# Patient Record
Sex: FEMALE | Race: WHITE | Employment: STUDENT | URBAN - METROPOLITAN AREA
[De-identification: names, ages, dates, MRNs, and addresses within clinical notes are randomized per-mention and may not be internally consistent; named-entity substitution may affect disease eponyms.]

---
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
## 2018-02-12 DIAGNOSIS — E03.9 ACQUIRED HYPOTHYROIDISM: ICD-10-CM

## 2018-02-12 RX ORDER — LEVOTHYROXINE SODIUM 200 UG/1
TABLET ORAL
Qty: 90 TAB | Refills: 0 | OUTPATIENT
Start: 2018-02-12

## 2018-02-12 RX ORDER — LEVOTHYROXINE SODIUM 50 UG/1
TABLET ORAL
Qty: 90 TAB | Refills: 0 | Status: SHIPPED | OUTPATIENT
Start: 2018-02-12 | End: 2019-03-18 | Stop reason: SDUPTHER

## 2018-02-12 RX ORDER — LEVOTHYROXINE SODIUM 50 UG/1
TABLET ORAL
Qty: 90 TAB | Refills: 0 | OUTPATIENT
Start: 2018-02-12

## 2018-02-12 RX ORDER — LEVOTHYROXINE SODIUM 200 UG/1
TABLET ORAL
Qty: 90 TAB | Refills: 0 | Status: SHIPPED | OUTPATIENT
Start: 2018-02-12 | End: 2019-01-31 | Stop reason: SDUPTHER

## 2018-02-24 LAB
25(OH)D3+25(OH)D2 SERPL-MCNC: NORMAL NG/ML
CHOLEST SERPL-MCNC: NORMAL MG/DL
EST. AVERAGE GLUCOSE BLD GHB EST-MCNC: 80 MG/DL
HBA1C MFR BLD: 4.4 % (ref 4.8–5.6)
HDLC SERPL-MCNC: NORMAL MG/DL
INTERPRETATION, 910389: NORMAL
PDF IMAGE, 910387: NORMAL
T4 FREE SERPL-MCNC: NORMAL NG/DL
TRIGL SERPL-MCNC: NORMAL MG/DL (ref ?–150)
TSH SERPL DL<=0.005 MIU/L-ACNC: NORMAL UIU/ML

## 2018-02-27 DIAGNOSIS — E03.9 HYPOTHYROIDISM, UNSPECIFIED TYPE: Primary | ICD-10-CM

## 2018-02-27 DIAGNOSIS — E55.9 VITAMIN D DEFICIENCY: ICD-10-CM

## 2018-02-27 DIAGNOSIS — E78.5 HYPERLIPIDEMIA, UNSPECIFIED HYPERLIPIDEMIA TYPE: ICD-10-CM

## 2018-02-27 NOTE — PROGRESS NOTES
Please notify patient regarding their test results:    Unfortunately, due to unclear reasons, lab was unable to process prior lab orders. Patient to return to clinic again to repeat fasting lab work.

## 2018-03-08 NOTE — PROGRESS NOTES
Incoming call from patient's mother on phi form. Patient's  verified. Informed mother labs were not drawn for unknown reason. She states she received a bill for th A1C. Advised that was the only lab that was collected. Mother states she cannot afford these labs (vitamin d, lipid panel, tsh) as she pays out of pocket for this. Advised it is up to the patient whether or not she wants to get these labs done we can only recommend them. Most importantly tsh needs to be drawn for med management. Mother understands.  Advised to come by whenever for labs

## 2018-03-12 ENCOUNTER — OFFICE VISIT (OUTPATIENT)
Dept: FAMILY MEDICINE CLINIC | Age: 18
End: 2018-03-12

## 2018-03-12 VITALS
HEART RATE: 86 BPM | SYSTOLIC BLOOD PRESSURE: 94 MMHG | RESPIRATION RATE: 18 BRPM | TEMPERATURE: 98.1 F | WEIGHT: 293 LBS | BODY MASS INDEX: 44.41 KG/M2 | DIASTOLIC BLOOD PRESSURE: 64 MMHG | HEIGHT: 68 IN | OXYGEN SATURATION: 96 %

## 2018-03-12 DIAGNOSIS — R07.81 RIB PAIN ON RIGHT SIDE: Primary | ICD-10-CM

## 2018-03-12 DIAGNOSIS — E03.9 ACQUIRED HYPOTHYROIDISM: ICD-10-CM

## 2018-03-12 DIAGNOSIS — R09.81 SINUS CONGESTION: ICD-10-CM

## 2018-03-12 NOTE — PATIENT INSTRUCTIONS
Strain or Sprain: Care Instructions  Your Care Instructions    A strain happens when you overstretch, or pull, a muscle. A sprain occurs when you stretch or tear a ligament, the tough tissue that connects one bone to another. These problems can happen when you exercise or lift something or when you are in an accident. Rest and other home care can help strains and sprains heal.  The doctor has checked you carefully, but problems can develop later. If you notice any problems or new symptoms,  get medical treatment right away. Follow-up care is a key part of your treatment and safety. Be sure to make and go to all appointments, and call your doctor if you are having problems. It's also a good idea to know your test results and keep a list of the medicines you take. How can you care for yourself at home? · If your doctor gave you a sling, splint, brace, or immobilizer, use it exactly as directed. · Rest the strained or sprained area, and follow your doctor's advice about when you can be active again. · Put ice or a cold pack on the sore area for 10 to 20 minutes at a time to stop swelling. Try this every 1 to 2 hours for 3 days (when you are awake) or until the swelling goes down. Put a thin cloth between the ice pack and your skin. Keep your splint or brace dry. · Prop up a sore arm or leg on a pillow when you ice it or anytime you sit or lie down. Try to keep it higher than the level of your heart. This will help reduce swelling. · Take pain medicines exactly as directed. ¨ If the doctor gave you a prescription medicine for pain, take it as prescribed. ¨ If you are not taking a prescription pain medicine, ask your doctor if you can take an over-the-counter medicine. · Do exercises as directed by your doctor or physical therapist.  · Return to your usual level of activity slowly. · Do not do anything that makes the pain worse. When should you call for help?   Call your doctor now or seek immediate medical care if:  ? · You have severe or increasing pain. ? · You have tingling, weakness, or numbness in the area. ? · The area turns cold or changes color. ? · Your cast or splint feels too tight. ? · You have symptoms of a blood clot, such as:  ¨ Pain in your calf, back of the knee, thigh, or groin. ¨ Redness and swelling in your leg or groin. ? · You cannot move the strained part of your body. ? Watch closely for changes in your health, and be sure to contact your doctor if:  ? · You do not get better as expected. Where can you learn more? Go to http://angelito-brandon.info/. Enter B563 in the search box to learn more about \"Strain or Sprain: Care Instructions. \"  Current as of: March 21, 2017  Content Version: 11.4  © 7867-3171 [employer]. Care instructions adapted under license by [employer] (which disclaims liability or warranty for this information). If you have questions about a medical condition or this instruction, always ask your healthcare professional. [address] any warranty or liability for your use of this information.

## 2018-03-12 NOTE — LETTER
3/21/2018 12:07 PM 
 
Ms. Radha Dhillon [address]  [facility] [address] Dear Radha Dhillon: 
 
Please find your most recent results below. Resulted Orders TSH AND FREE T4 Result Value Ref Range TSH 0.327 (L) 0.450 - 4.500 uIU/mL T4, Free 2.36 (H) 0.93 - 1.60 ng/dL Narrative Performed at:  [address]  [id number] : Tali Quintanilla MD, Phone:  [phone] RECOMMENDATIONS: 
 
TSH shows that she is on too high of dose (likely due to the fact you are more compliant with taking levothyroxine which is good). Would reduce levothyroxine from 250 mcg daily to 225 mcg daily (can just take 1/2 tab of 50 mcg tab + one 200 mg tab daily = 225 mcg). Will recheck TSH at upcoming appt in April. Please call me if you have any questions: [phone] Sincerely, Amy Stovall MD

## 2018-03-12 NOTE — PROGRESS NOTES
Chief Complaint   Patient presents with    Rib Pain     right side for four to five days - does not recall injuring self    Cold Symptoms     ? sinus infections -for four days      1. Have you been to the ER, urgent care clinic since your last visit? Hospitalized since your last visit? No    2. Have you seen or consulted any other health care providers outside of the [facility] since your last visit? Include any pap smears or colon screening.  No

## 2018-03-12 NOTE — LETTER
NOTIFICATION RETURN TO WORK / SCHOOL 
 
3/12/2018 2:59 PM 
 
Ms. Leola Vang [address]  [facility] [address] To Whom It May Concern: 
 
Leola Vang is currently under the care of RONDA Boone. She was seen 3/12/2018 for an appointment. She will return to work/school on: 3/13/2018 If there are questions or concerns please have the patient contact our office. Sincerely, Dago Salazar MD

## 2018-03-12 NOTE — MR AVS SNAPSHOT
[address] [facility] 
 
 
 [address] [place] [address] 
[phone] Patient: Neli Estrada MRN: [id number] Ondina Hart Visit Information Date & Time Provider Department Dept. Phone Encounter #  
 3/12/2018  2:15 PM Esthela Wren MD [address] [place] [address] [phone] [id number] Follow-up Instructions Return if symptoms worsen or fail to improve. Your Appointments 4/20/2018  1:45 PM  
ROUTINE CARE with Esthela Wren MD  
[facility]) Appt Note: 6 month follow up visit [address] [address]  
[phone]  
  
   
 [address] [address] Upcoming Health Maintenance Date Due DTaP/Tdap/Td series (7 - Td) 6/18/2026 Allergies as of 3/12/2018  Review Complete On: 3/12/2018 By: Grisel Johnson LPN Severity Noted Reaction Type Reactions Hazelnut High 08/09/2016    Anaphylaxis Adhesive Tape-silicones  54/54/8511    Itching Paper tape [place]  05/10/2016    Swelling Grass Pollen  05/10/2016    Unknown (comments) + testing Current Immunizations  Reviewed on 12/26/2017 Name Date DTaP 12/16/2002, 8/10/1999, 8/8/1999, 1998, 1998 HPV 1/12/2009, 9/18/2008, 6/27/2008 Hep A Vaccine 9/18/2008, 12/16/2002 Hep B Vaccine 5/12/1999, 1998, 1998 Hib 8/10/1999, 1998, 1998 Influenza Vaccine (Quad) PF 10/20/2017, 10/11/2016 MMR 12/16/2002, 8/10/1999 Meningococcal (MCV4O) Vaccine 8/9/2016 Meningococcal ACWY Vaccine 5/6/2010 Meningococcal B (OMV) Vaccine 9/13/2016, 8/9/2016 Poliovirus vaccine 12/16/2002, 8/10/1999, 1998, 1998 Td 6/18/2016 Tdap 5/6/2010 Varicella Virus Vaccine 8/10/1999, 1998 Not reviewed this visit You Were Diagnosed With   
  
 Codes Comments Acquired hypothyroidism    -  Primary ICD-10-CM: E03.9 ICD-9-CM: 908. 9 Vitals BP Pulse Temp Resp Height(growth percentile) Weight(growth percentile) 94/64 (4 %/ 46 %)* (BP 1 Location: Left arm, BP Patient Position: Sitting) 86 98.1 °F (36.7 °C) (Oral) 18 5' 7.5\" (1.715 m) (90 %, Z= 1.26) 300 lb 3.2 oz (136.2 kg) (>99 %, Z= 2.84) LMP SpO2 BMI OB Status Smoking Status 03/08/2018 96% 46.32 kg/m2 (>99 %, Z= 2.34) Having regular periods Never Smoker *BP percentiles are based on NHBPEP's 4th Report Growth percentiles are based on CDC 2-20 Years data. BMI and BSA Data Body Mass Index Body Surface Area  
 46.32 kg/m 2 2.55 m 2 Preferred Pharmacy Pharmacy Name Phone [facility] [address], [address] [place] [address] Avenue [phone] Your Updated Medication List  
  
   
This list is accurate as of 3/12/18  3:00 PM.  Always use your most recent med list.  
  
  
  
  
 DULoxetine 60 mg capsule Commonly known as:  CYMBALTA TAKE 1 CAPSULE BY MOUTH DAILY  
  
 EPIPEN 2-JOSE 0.3 mg/0.3 mL injection Generic drug:  EPINEPHrine  
  
 fexofenadine 180 mg tablet Commonly known as:  Renny Jayden Take  by mouth. ibuprofen 800 mg tablet Commonly known as:  MOTRIN  
200 mg every eight (8) hours as needed. IRON PO Take  by mouth. * levothyroxine 200 mcg tablet Commonly known as:  SYNTHROID  
TAKE ONE TABLET BY MOUTH ONCE DAILY ALONG WITH 50 MCG LEVOTHYROXINE TABLET * levothyroxine 50 mcg tablet Commonly known as:  SYNTHROID  
TAKE ONE TABLET BY MOUTH ONCE DAILY ALONG WITH 200 MCG TABLET  
  
 montelukast 10 mg tablet Commonly known as:  SINGULAIR  
TAKE 1 TABLET BY MOUTH EVERY DAY  
  
 NUVARING 0.12-0.015 mg/24 hr vaginal ring Generic drug:  ethinyl estradiol-etonogestrel INSERT VAGINALY X 3 WEEKS, REMOVE FOR 1 WEEK, THEN REPEAT  
  
 SUMAtriptan 25 mg tablet Commonly known as:  IMITREX Take 1 Tab by mouth as needed for Migraine for up to 1 dose. VITAMIN D3 2,000 unit Tab Generic drug:  cholecalciferol (vitamin D3) Take 2,000 Units by mouth daily. * Notice: This list has 2 medication(s) that are the same as other medications prescribed for you. Read the directions carefully, and ask your doctor or other care provider to review them with you. We Performed the Following TSH AND FREE T4 [29657 CPT(R)] Follow-up Instructions Return if symptoms worsen or fail to improve. Patient Instructions Strain or Sprain: Care Instructions Your Care Instructions A strain happens when you overstretch, or pull, a muscle. A sprain occurs when you stretch or tear a ligament, the tough tissue that connects one bone to another. These problems can happen when you exercise or lift something or when you are in an accident. Rest and other home care can help strains and sprains heal. 
The doctor has checked you carefully, but problems can develop later. If you notice any problems or new symptoms,  get medical treatment right away. Follow-up care is a key part of your treatment and safety. Be sure to make and go to all appointments, and call your doctor if you are having problems. It's also a good idea to know your test results and keep a list of the medicines you take. How can you care for yourself at home? · If your doctor gave you a sling, splint, brace, or immobilizer, use it exactly as directed. · Rest the strained or sprained area, and follow your doctor's advice about when you can be active again. · Put ice or a cold pack on the sore area for 10 to 20 minutes at a time to stop swelling. Try this every 1 to 2 hours for 3 days (when you are awake) or until the swelling goes down. Put a thin cloth between the ice pack and your skin. Keep your splint or brace dry. · Prop up a sore arm or leg on a pillow when you ice it or anytime you sit or lie down. Try to keep it higher than the level of your heart.  This will help reduce swelling. · Take pain medicines exactly as directed. ¨ If the doctor gave you a prescription medicine for pain, take it as prescribed. ¨ If you are not taking a prescription pain medicine, ask your doctor if you can take an over-the-counter medicine. · Do exercises as directed by your doctor or physical therapist. 
· Return to your usual level of activity slowly. · Do not do anything that makes the pain worse. When should you call for help? Call your doctor now or seek immediate medical care if: 
? · You have severe or increasing pain. ? · You have tingling, weakness, or numbness in the area. ? · The area turns cold or changes color. ? · Your cast or splint feels too tight. ? · You have symptoms of a blood clot, such as: 
¨ Pain in your calf, back of the knee, thigh, or groin. ¨ Redness and swelling in your leg or groin. ? · You cannot move the strained part of your body. ? Watch closely for changes in your health, and be sure to contact your doctor if: 
? · You do not get better as expected. Where can you learn more? Go to http://angelito-brandon.info/. Enter K011 in the search box to learn more about \"Strain or Sprain: Care Instructions. \" Current as of: March 21, 2017 Content Version: 11.4 © 0691-7189 [employer]. Care instructions adapted under license by [employer] (which disclaims liability or warranty for this information). If you have questions about a medical condition or this instruction, always ask your healthcare professional. [place] [address] any warranty or liability for your use of this information. Introducing [facility]! [employer] introduces [employer] patient portal. Now you can access parts of your medical record, email your doctor's office, and request medication refills online. 1. In your internet browser, go to [email or web address] 2. Click on the First Time User? Click Here link in the Sign In box. You will see the New Member Sign Up page. 3. Enter your [facility] Access Code exactly as it appears below. You will not need to use this code after youve completed the sign-up process. If you do not sign up before the expiration date, you must request a new code. · Life [employer] Access Code: [email or web address] Expires: 6/10/2018  2:24 PM 
 
4. Enter the last four digits of your Social Security Number (xxxx) and Date of Birth (mm/dd/yyyy) as indicated and click Submit. You will be taken to the next sign-up page. 5. Create a Life With [facility] ID. This will be your Life [employer] login ID and cannot be changed, so think of one that is secure and easy to remember. 6. Create a [employer] password. You can change your password at any time. 7. Enter your Password Reset Question and Answer. This can be used at a later time if you forget your password. 8. Enter your e-mail address. You will receive e-mail notification when new information is available in [address] 9. Click Sign Up. You can now view and download portions of your medical record. 10. Click the Download Summary menu link to download a portable copy of your medical information. If you have questions, please visit the Frequently Asked Questions section of the Life [employer] website. Remember, [employer] is NOT to be used for urgent needs. For medical emergencies, dial 911. Now available from your iPhone and Android! Please provide this summary of care documentation to your next provider. Your primary care clinician is listed as Luis Alfredo Pappas. If you have any questions after today's visit, please call [phone].

## 2018-03-12 NOTE — PROGRESS NOTES
Patient Name: Elwin Cockayne   MRN: [id number]    Lakhwinder Chavez is a 21 y.o. female who presents with the following:     Patient reports 4 day history of intermittent lower right rib cage pain along her back. Patient reports that it was random on onset; denies any history of injury, heavy lifting, coughing, rash. Also started to have sinus congestion and headache today after onset of back pain. Currently using sinus rinses. Denies any history of smoking, fever, chest pain, shortness of breath, recent travel, dysuria, history of kidney stones. Overdue for TSH recheck. Review of Systems   Constitutional: Negative for fever, malaise/fatigue and weight loss. HENT: Positive for congestion and sinus pain. Negative for sore throat. Respiratory: Negative for cough, hemoptysis, shortness of breath and wheezing. Cardiovascular: Negative for chest pain, palpitations, leg swelling and PND. Gastrointestinal: Negative for abdominal pain, constipation, diarrhea, nausea and vomiting. Genitourinary: Negative for dysuria, flank pain, frequency, hematuria and urgency. Musculoskeletal: Positive for back pain. Skin: Negative for itching and rash. The patient's medications, allergies, past medical history, surgical history, family history and social history were reviewed and updated where appropriate. Prior to Admission medications    Medication Sig Start Date End Date Taking?  Authorizing Provider   levothyroxine (SYNTHROID) 200 mcg tablet TAKE ONE TABLET BY MOUTH ONCE DAILY ALONG WITH 50 MCG LEVOTHYROXINE TABLET 2/12/18  Yes Luis Alfredo Pappas MD   levothyroxine (SYNTHROID) 50 mcg tablet TAKE ONE TABLET BY MOUTH ONCE DAILY ALONG WITH 200 MCG TABLET 2/12/18  Yes Luis Alfredo Pappas MD   DULoxetine (CYMBALTA) 60 mg capsule TAKE 1 CAPSULE BY MOUTH DAILY 1/16/18  Yes Luis Alfredo Pappas MD   montelukast (SINGULAIR) 10 mg tablet TAKE 1 TABLET BY MOUTH EVERY DAY 8/17/17  Yes Sangeetha Owusu MD SUMAtriptan (IMITREX) 25 mg tablet Take 1 Tab by mouth as needed for Migraine for up to 1 dose. 8/17/17  Yes Luis Alfredo Pappas MD   NUVARING 0.12-0.015 mg/24 hr vaginal ring INSERT VAGINALY X 3 WEEKS, REMOVE FOR 1 WEEK, THEN REPEAT 6/8/17  Yes Historical Provider   cholecalciferol, vitamin D3, (VITAMIN D3) 2,000 unit tab Take 2,000 Units by mouth daily. Yes Historical Provider   FERROUS FUMARATE (IRON PO) Take  by mouth. Yes Historical Provider   EPIPEN 2-JOSE 0.3 mg/0.3 mL injection  5/6/16  Yes Historical Provider   fexofenadine (ALLEGRA) 180 mg tablet Take  by mouth. Yes Historical Provider   ibuprofen (MOTRIN) 800 mg tablet 200 mg every eight (8) hours as needed. 5/7/17   Historical Provider       Allergies   Allergen Reactions    Hazelnut Anaphylaxis    Adhesive Tape-Silicones Itching     Paper tape    [place] Swelling    Grass Pollen Unknown (comments)     + testing           OBJECTIVE    Visit Vitals    BP 94/64 (BP 1 Location: Left arm, BP Patient Position: Sitting)    Pulse 86    Temp 98.1 °F (36.7 °C) (Oral)    Resp 18    Ht 5' 7.5\" (1.715 m)    Wt 300 lb 3.2 oz (136.2 kg)    LMP 03/08/2018    SpO2 96%    BMI 46.32 kg/m2       Physical Exam   Constitutional: She is oriented to person, place, and time and well-developed, well-nourished, and in no distress. No distress. HENT:   Head: Normocephalic and atraumatic. Right Ear: Tympanic membrane is not perforated and not erythematous. No middle ear effusion. No decreased hearing is noted. Left Ear: Tympanic membrane is not perforated and not erythematous. No middle ear effusion. No decreased hearing is noted. Nose: Right sinus exhibits maxillary sinus tenderness and frontal sinus tenderness. Left sinus exhibits maxillary sinus tenderness and frontal sinus tenderness. Mouth/Throat: Uvula is midline, oropharynx is clear and moist and mucous membranes are normal.   Neck: Normal range of motion. Neck supple.    Cardiovascular: Normal rate, regular rhythm and normal heart sounds. Exam reveals no gallop and no friction rub. No murmur heard. Pulmonary/Chest: Effort normal and breath sounds normal. No respiratory distress. She has no wheezes. Musculoskeletal:        Arms:  TTP along lower posterior rib cafe and intercostal muscles; worse with lateral rotation of hip; no overlying skin changes or gross deformities. Lymphadenopathy:     She has no cervical adenopathy. Neurological: She is alert and oriented to person, place, and time. Skin: Skin is warm and dry. No rash noted. She is not diaphoretic. Psychiatric: Mood, memory, affect and judgment normal.   Nursing note and vitals reviewed. ASSESSMENT AND PLAN  Yoselyn Bradshaw is a 21 y.o. female who presents today for:    1. Rib pain on right side  Suspect possible MSK strain. Recommend rest, heat/ice, as needed Tylenol or Advil. Offered low-dose muscle relaxant but the patient would like to hold off on this at this time. Reviewed that if she develops a rash in the affected area, she should contact our office. Wells score is low. Pt denies hx of UTI symptoms or hx of kidney stones. Reviewed signs and symptoms that would indicate a worsening medical condition which would require immediate evaluation and treatment; patient expressed understanding of plan. 2. Sinus congestion  discussed diagnosis & treatment options, most likely viral at this time, reviewed the importance of avoiding unnecessary antibiotic therapy, reviewed which OTC medications to use and avoid, expected time course for resolution & red flags were reviewed with her to RTC or notify me. 3. Acquired hypothyroidism  Stable, continue current treatment pending review of labs. - TSH AND FREE T4       There are no discontinued medications. Follow-up Disposition:  Return if symptoms worsen or fail to improve. Medication risks/benefits/costs/interactions/alternatives discussed with patient.   Advised patient to call back or return to office if symptoms worsen/change/persist. If patient cannot reach us or should anything more severe/urgent arise he/she should proceed directly to the nearest emergency department. Discussed expected course/resolution/complications of diagnosis in detail with patient. Patient given a written after visit summary which includes his/her diagnoses, current medications and vitals. Patient expressed understanding with the diagnosis and plan.      Dago Salazar M.D.

## 2018-03-13 LAB
T4 FREE SERPL-MCNC: 2.36 NG/DL (ref 0.93–1.6)
TSH SERPL DL<=0.005 MIU/L-ACNC: 0.33 UIU/ML (ref 0.45–4.5)

## 2018-03-13 NOTE — PROGRESS NOTES
Please notify patient regarding their test results:    TSH shows that she is on too high of dose (likely due to the fact she is more compliant with taking levothyroxine which is good). Would reduce levothyroxine from 250 mcg daily to 225 mcg daily (can just take 1/2 tab of 50 mcg tab + one 200 mg tab daily = 225 mcg). Will recheck TSH at upcoming appt in April.

## 2018-03-21 NOTE — PROGRESS NOTES
Called patient a third time, left a message informing that lab results will be mailed to her with any recommendations.

## 2018-07-13 ENCOUNTER — HOSPITAL ENCOUNTER (EMERGENCY)
Age: 18
Discharge: HOME | End: 2018-07-13
Attending: EMERGENCY MEDICINE
Payer: SUBSIDIZED

## 2018-07-13 ENCOUNTER — APPOINTMENT (OUTPATIENT)
Dept: GENERAL RADIOLOGY | Age: 18
End: 2018-07-13
Attending: EMERGENCY MEDICINE
Payer: SUBSIDIZED

## 2018-07-13 VITALS
OXYGEN SATURATION: 100 % | WEIGHT: 293 LBS | BODY MASS INDEX: 45.69 KG/M2 | SYSTOLIC BLOOD PRESSURE: 109 MMHG | HEART RATE: 85 BPM | TEMPERATURE: 98.7 F | DIASTOLIC BLOOD PRESSURE: 67 MMHG | RESPIRATION RATE: 18 BRPM

## 2018-07-13 DIAGNOSIS — S82.832A CLOSED FRACTURE OF DISTAL END OF LEFT FIBULA, UNSPECIFIED FRACTURE MORPHOLOGY, INITIAL ENCOUNTER: Primary | ICD-10-CM

## 2018-07-13 PROCEDURE — 75810000053 HC SPLINT APPLICATION

## 2018-07-13 PROCEDURE — 99283 EMERGENCY DEPT VISIT LOW MDM: CPT

## 2018-07-13 PROCEDURE — 73610 X-RAY EXAM OF ANKLE: CPT

## 2018-07-13 PROCEDURE — 73630 X-RAY EXAM OF FOOT: CPT

## 2018-07-13 RX ORDER — OXYCODONE AND ACETAMINOPHEN 5; 325 MG/1; MG/1
1 TABLET ORAL
Qty: 5 TAB | Refills: 0 | Status: SHIPPED | OUTPATIENT
Start: 2018-07-13 | End: 2019-03-15

## 2018-07-14 NOTE — ED PROVIDER NOTES
HPI       20y F here with L ankle/foot pain. Reports walking on stairs when the ankle gave out, causing her to fall. Says the ankle both inverted the everted during the fall. She felt something \"pop. \" Has not really been able to walk since the accident and noticed the ankle was swollen. Took motrin prior to arrival with minimal relief. No weakness or numbness in the foot. No other injuries or complaints of pain. Says it is the entire ankle that is hurting as well as the proximal, lateral part of the foot.     Past Medical History:   Diagnosis Date    Adverse effect of anesthesia     REQUIRES A LOT OF ANESTHESIA PER MOTHER    Anemia     Attention deficit disorder (ADD)     Closed fracture of metatarsal neck 11/11/2015    [facility], 9/15/15 with Dr. Juan Pinto alignment and healing     Conversion disorder     Hx of seasonal allergies     Hypothyroidism 1/8/2014    Kyphosis     Obesity     Scoliosis     Vision decreased     Vitamin D deficiency 1/8/2014    Took 12 weeks of weekly 60345 international units of vitamin d and now on 2000 international units  daily          Past Surgical History:   Procedure Laterality Date    HX BACK SURGERY  2009    Thoracic cage with screws    HX KNEE ARTHROSCOPY  01/2017    LEFT KNEE         Family History:   Problem Relation Age of Onset    Heart Failure Maternal Grandmother     Hypertension Maternal Grandmother     Asthma Maternal Grandmother     Cancer Paternal Grandmother      lung    Arthritis-osteo Mother     Thyroid Disease Mother     Diabetes Maternal Grandfather     Diabetes Paternal Grandfather     No Known Problems Father     No Known Problems Sister     No Known Problems Brother     No Known Problems Maternal Aunt     No Known Problems Maternal Uncle     No Known Problems Paternal Aunt     No Known Problems Paternal Uncle     No Known Problems Other        Social History     Social History    Marital status: SINGLE     Spouse name: N/A    Number of children: N/A    Years of education: N/A     Occupational History    Not on file. 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 HEENT: (-) stiff neck   Eyes: (-) discharge. Respiratory: (-) for cough. Cardiovascular: (-) syncope. Gastrointestinal: (-) blood in stool. Genitourinary: (-) hematuria. Musculoskeletal: (-) myalgias. Neurological: (-) seizure. Skin: (-) petechiae  Lymph/Immunologic: (-) enlarged lymph nodes  All other systems reviewed and are negative. Vitals:    07/13/18 2213   Weight: 134.3 kg (296 lb 1.2 oz)            Physical Exam Nursing note and vitals reviewed. Constitutional: oriented to person, place, and time. appears well-developed and well-nourished. No distress. Head: Normocephalic and atraumatic. Sclera anicteric  Nose: No rhinorrhea  Mouth/Throat: Oropharynx is clear and moist. Pharynx normal  Eyes: Conjunctivae are normal. Pupils are equal, round, and reactive to light. Right eye exhibits no discharge. Left eye exhibits no discharge. Neck: Painless normal range of motion. Neck supple. No LAD. Cardiovascular: Normal rate, regular rhythm, normal heart sounds and intact distal pulses. Exam reveals no gallop and no friction rub. No murmur heard. Pulmonary/Chest:  No respiratory distress. No wheezes. No rales. No rhonchi. No increased work of breathing. No accessory muscle use. Good air exchange throughout. Abdominal: soft, non-tender, no rebound or guarding. No hepatosplenomegaly. Normal bowel sounds throughout. Back: no tenderness to palpation, no deformities, no CVA tenderness  Extremities/Musculoskeletal: L ankle with swelling to the lateral aspect and tender on both sides. Tender to palpation in the lateral forefoot on the L side as well. Distal extremities are neurovasc intact. Lymphadenopathy:   No adenopathy. Neurological:  Alert and oriented to person, place, and time. Coordination normal. CN 2-12 intact. Motor and sensory function intact. Skin: Skin is warm and dry. No rash noted. No pallor. MDM 20y F here with L foot/ankle pain after twisting it. Will check xrays. ED Course       Procedures      10:58 PM  Small fx of distal fibula. Non-displaced. Will splint and give crutches. Pt states she needs [facility] follow-up due to insurance.

## 2018-07-14 NOTE — ED TRIAGE NOTES
TRIAGE: Patient rolled her ankle while walking down stairs. Patient felt something pop. Patient with swelling to lateral side of ankle.

## 2018-07-14 NOTE — DISCHARGE INSTRUCTIONS
Broken Ankle: Care Instructions  Your Care Instructions    An ankle may break (fracture) during sports, a fall, or other accidents. Fractures can range from a small, hairline crack, to a bone or bones broken into two or more pieces. Your treatment depends on how bad the break is. Your doctor may have put your ankle in a splint or cast to allow it to heal or to keep it stable until you see another doctor. It may take weeks or months for your ankle to heal. You can help your ankle heal with some care at home. You heal best when you take good care of yourself. Eat a variety of healthy foods, and don't smoke. You may have had a sedative to help you relax. You may be unsteady after having sedation. It can take a few hours for the medicine's effects to wear off. Common side effects of sedation include nausea, vomiting, and feeling sleepy or tired. The doctor has checked you carefully, but problems can develop later. If you notice any problems or new symptoms,  get medical treatment right away. Follow-up care is a key part of your treatment and safety. Be sure to make and go to all appointments, and call your doctor if you are having problems. It's also a good idea to know your test results and keep a list of the medicines you take. How can you care for yourself at home? · If the doctor gave you a sedative:  ¨ For 24 hours, don't do anything that requires attention to detail. It takes time for the medicine's effects to completely wear off. ¨ For your safety, do not drive or operate any machinery that could be dangerous. Wait until the medicine wears off and you can think clearly and react easily. · Put ice or a cold pack on your ankle for 10 to 20 minutes at a time. Try to do this every 1 to 2 hours for the next 3 days (when you are awake). Put a thin cloth between the ice and your cast or splint. Keep your cast or splint dry. · Follow the cast care instructions your doctor gives you.  If you have a splint, do not take it off unless your doctor tells you to. · Be safe with medicines. Take pain medicines exactly as directed. ¨ If the doctor gave you a prescription medicine for pain, take it as prescribed. ¨ If you are not taking a prescription pain medicine, ask your doctor if you can take an over-the-counter medicine. · Prop up your leg on pillows in the first few days after the injury. Keep the ankle higher than the level of your heart. This will help reduce swelling. · Do not put weight on your ankle unless your doctor tells you to. Use crutches to walk. · Follow instructions for exercises to keep your leg strong. · Wiggle your toes often to reduce swelling and stiffness. When should you call for help? Call 911 anytime you think you may need emergency care. For example, call if:    · You have chest pain, are short of breath, or you cough up blood.     · You are very sleepy and you have trouble waking up.    Call your doctor now or seek immediate medical care if:    · You have new or worse nausea or vomiting.     · You have new or worse pain.     · Your foot is cool or pale or changes color.     · You have tingling, weakness, or numbness in your toes.     · Your cast or splint feels too tight.     · You have signs of a blood clot in your leg (called a deep vein thrombosis), such as:  ¨ Pain in your calf, back of the knee, thigh, or groin. ¨ Redness or swelling in your leg.    Watch closely for changes in your health, and be sure to contact your doctor if:    · You have a problem with your splint or cast.     · You do not get better as expected. Where can you learn more? Go to http://angelito-brandon.info/. Enter P763 in the search box to learn more about \"Broken Ankle: Care Instructions. \"  Current as of: November 29, 2017  Content Version: 11.7  © 8431-3832 [employer].  Care instructions adapted under license by [employer] (which disclaims liability or warranty for this information). If you have questions about a medical condition or this instruction, always ask your healthcare professional. [place] [address] any warranty or liability for your use of this information.

## 2018-07-14 NOTE — ED NOTES
Pt discharged home. Pt acting age appropriately, respirations regular and unlabored. . No further complaints at this time. Parent/guardian verbalized understanding of discharge paperwork and has no further questions at this time. Education provided about continuation of care, follow up care with ortho and medication administration with percocet for pain. Splint education reviewed. Parent/guardian able to provided teach back about discharge instructions.

## 2018-11-30 ENCOUNTER — OFFICE VISIT (OUTPATIENT)
Dept: FAMILY MEDICINE CLINIC | Age: 18
End: 2018-11-30

## 2018-11-30 VITALS
WEIGHT: 293 LBS | TEMPERATURE: 98.6 F | SYSTOLIC BLOOD PRESSURE: 118 MMHG | RESPIRATION RATE: 18 BRPM | HEIGHT: 68 IN | BODY MASS INDEX: 44.41 KG/M2 | DIASTOLIC BLOOD PRESSURE: 80 MMHG | HEART RATE: 84 BPM | OXYGEN SATURATION: 98 %

## 2018-11-30 DIAGNOSIS — A08.4 VIRAL GASTROENTERITIS: Primary | ICD-10-CM

## 2018-11-30 NOTE — PROGRESS NOTES
Chief Complaint   Patient presents with    Diarrhea     x 2 days    Vomiting     x 2 days    Headache     x 2 days      1. Have you been to the ER, urgent care clinic since your last visit? Hospitalized since your last visit? No    2. Have you seen or consulted any other health care providers outside of the [address] [place] [address] since your last visit? Include any pap smears or colon screening.  No

## 2018-11-30 NOTE — PATIENT INSTRUCTIONS
Gastroenteritis: Care Instructions  Your Care Instructions    Gastroenteritis is an illness that may cause nausea, vomiting, and diarrhea. It is sometimes called \"stomach flu. \" It can be caused by bacteria or a virus. You will probably begin to feel better in 1 to 2 days. In the meantime, get plenty of rest and make sure you do not become dehydrated. Dehydration occurs when your body loses too much fluid. Follow-up care is a key part of your treatment and safety. Be sure to make and go to all appointments, and call your doctor if you are having problems. It's also a good idea to know your test results and keep a list of the medicines you take. How can you care for yourself at home? · If your doctor prescribed antibiotics, take them as directed. Do not stop taking them just because you feel better. You need to take the full course of antibiotics. · Drink plenty of fluids to prevent dehydration, enough so that your urine is light yellow or clear like water. Choose water and other caffeine-free clear liquids until you feel better. If you have kidney, heart, or liver disease and have to limit fluids, talk with your doctor before you increase your fluid intake. · Drink fluids slowly, in frequent, small amounts, because drinking too much too fast can cause vomiting. · Begin eating mild foods, such as dry toast, yogurt, applesauce, bananas, and rice. Avoid spicy, hot, or high-fat foods, and do not drink alcohol or caffeine for a day or two. Do not drink milk or eat ice cream until you are feeling better. How to prevent gastroenteritis  · Keep hot foods hot and cold foods cold. · Do not eat meats, dressings, salads, or other foods that have been kept at room temperature for more than 2 hours. · Use a thermometer to check your refrigerator. It should be between 34°F and 40°F.  · Defrost meats in the refrigerator or microwave, not on the kitchen counter. · Keep your hands and your kitchen clean.  Wash your hands, cutting boards, and countertops with hot soapy water frequently. · Cook meat until it is well done. · Do not eat raw eggs or uncooked sauces made with raw eggs. · Do not take chances. If food looks or tastes spoiled, throw it out. When should you call for help? Call 911 anytime you think you may need emergency care. For example, call if:    · You vomit blood or what looks like coffee grounds.     · You passed out (lost consciousness).     · You pass maroon or very bloody stools.    Call your doctor now or seek immediate medical care if:    · You have severe belly pain.     · You have signs of needing more fluids. You have sunken eyes, a dry mouth, and pass only a little dark urine.     · You feel like you are going to faint.     · You have increased belly pain that does not go away in 1 to 2 days.     · You have new or increased nausea, or you are vomiting.     · You have a new or higher fever.     · Your stools are black and tarlike or have streaks of blood.    Watch closely for changes in your health, and be sure to contact your doctor if:    · You are dizzy or lightheaded.     · You urinate less than usual, or your urine is dark yellow or brown.     · You do not feel better with each day that goes by. Where can you learn more? Go to http://angelito-brandon.info/. Enter N142 in the search box to learn more about \"Gastroenteritis: Care Instructions. \"  Current as of: November 18, 2017  Content Version: 11.8  © 5841-8300 [employer]. Care instructions adapted under license by [employer] (which disclaims liability or warranty for this information). If you have questions about a medical condition or this instruction, always ask your healthcare professional. [place] [address] any warranty or liability for your use of this information.

## 2018-11-30 NOTE — PROGRESS NOTES
Patient Name: Collette Bison   MRN: [id number]    Keon Dominguez is a 19 y.o. female who presents with the following:     Reports 2-day history of vomiting, diarrhea and headache. Believes that she got a viral GI bug. Overall feeling better with some residual symptoms. Tolerating oral intake. Denies any fever, dysuria, chest pain, shortness of breath. Needs a note for work. Review of Systems   Constitutional: Negative for fever, malaise/fatigue and weight loss. Respiratory: Negative for cough, hemoptysis, shortness of breath and wheezing. Cardiovascular: Negative for chest pain, palpitations, leg swelling and PND. Gastrointestinal: Positive for diarrhea, nausea and vomiting. Negative for abdominal pain and constipation. Neurological: Positive for headaches. The patient's medications, allergies, past medical history, surgical history, family history and social history were reviewed and updated where appropriate. Prior to Admission medications    Medication Sig Start Date End Date Taking? Authorizing Provider   oxyCODONE-acetaminophen (PERCOCET) 5-325 mg per tablet Take 1 Tab by mouth every four (4) hours as needed for Pain. Max Daily Amount: 6 Tabs. 7/13/18  Yes Robby Hopkins MD   DULoxetine (CYMBALTA) 60 mg capsule TAKE ONE CAPSULE BY MOUTH DAILY 5/4/18  Yes Aryan Morris MD   montelukast (SINGULAIR) 10 mg tablet TAKE ONE TABLET BY MOUTH DAILY 4/5/18  Yes Aryan Morris MD   levothyroxine (SYNTHROID) 200 mcg tablet TAKE ONE TABLET BY MOUTH ONCE DAILY ALONG WITH 50 MCG LEVOTHYROXINE TABLET 2/12/18  Yes Aryan Morris MD   levothyroxine (SYNTHROID) 50 mcg tablet TAKE ONE TABLET BY MOUTH ONCE DAILY ALONG WITH 200 MCG TABLET 2/12/18  Yes Aryan Morris MD   SUMAtriptan (IMITREX) 25 mg tablet Take 1 Tab by mouth as needed for Migraine for up to 1 dose.  8/17/17  Yes Aryan Morris MD   NUVARING 0.12-0.015 mg/24 hr vaginal ring INSERT VAGINALY X 3 WEEKS, REMOVE FOR 1 WEEK, THEN REPEAT 6/8/17  Yes Provider, Historical   ibuprofen (MOTRIN) 800 mg tablet 200 mg every eight (8) hours as needed. 5/7/17  Yes Provider, Historical   cholecalciferol, vitamin D3, (VITAMIN D3) 2,000 unit tab Take 2,000 Units by mouth daily. Yes Provider, Historical   FERROUS FUMARATE (IRON PO) Take  by mouth. Yes Provider, Historical   EPIPEN 2-JOSE 0.3 mg/0.3 mL injection  5/6/16  Yes Provider, Historical   fexofenadine (ALLEGRA) 180 mg tablet Take  by mouth. Yes Provider, Historical       Allergies   Allergen Reactions    Hazelnut Anaphylaxis    Adhesive Tape-Silicones Itching     Paper tape    [place] Swelling    Grass Pollen Unknown (comments)     + testing           OBJECTIVE    Visit Vitals  /80 (BP 1 Location: Left arm, BP Patient Position: Sitting)   Pulse 84   Temp 98.6 °F (37 °C) (Oral)   Resp 18   Ht 5' 7.5\" (1.715 m)   Wt 314 lb 9.6 oz (142.7 kg)   LMP 11/18/2018 (Exact Date)   SpO2 98%   BMI 48.55 kg/m²       Physical Exam   Constitutional: She is oriented to person, place, and time and well-developed, well-nourished, and in no distress. No distress. Eyes: Conjunctivae and EOM are normal. Pupils are equal, round, and reactive to light. Cardiovascular: Normal rate, regular rhythm and normal heart sounds. Exam reveals no gallop and no friction rub. No murmur heard. Pulmonary/Chest: Effort normal and breath sounds normal. No respiratory distress. She has no wheezes. Abdominal: Soft. Bowel sounds are normal. She exhibits no distension and no mass. There is no tenderness. There is no rebound and no guarding. Neurological: She is alert and oriented to person, place, and time. Skin: Skin is warm and dry. No rash noted. She is not diaphoretic. Psychiatric: Mood, memory, affect and judgment normal.   Nursing note and vitals reviewed. ASSESSMENT AND PLAN  Ria Hoover is a 19 y.o. female who presents today for:    1.  Viral gastroenteritis  Clinically improving. Recommend supportive care. Work note given. There are no discontinued medications. Follow-up Disposition:  Return if symptoms worsen or fail to improve. Medication risks/benefits/costs/interactions/alternatives discussed with patient. Advised patient to call back or return to office if symptoms worsen/change/persist. If patient cannot reach us or should anything more severe/urgent arise he/she should proceed directly to the nearest emergency department. Discussed expected course/resolution/complications of diagnosis in detail with patient. Patient given a written after visit summary which includes his/her diagnoses, current medications and vitals. Patient expressed understanding with the diagnosis and plan. Luis Alfredo Cochran M.D.

## 2018-11-30 NOTE — LETTER
NOTIFICATION RETURN TO WORK / SCHOOL 
 
11/30/2018 1:33 PM 
 
Ms. Andrew Adams [id number]  [facility] [id number] To Whom It May Concern: 
 
Andrew Adams is currently under the care of RONDA Boone. She was seen 11/30/2018. She will return to work/school on: 12/3/18 If there are questions or concerns please have the patient contact our office. Sincerely, Morales Godinez MD

## 2019-01-31 DIAGNOSIS — E03.9 ACQUIRED HYPOTHYROIDISM: ICD-10-CM

## 2019-01-31 RX ORDER — LEVOTHYROXINE SODIUM 200 UG/1
TABLET ORAL
Qty: 90 TAB | Refills: 0 | Status: SHIPPED | OUTPATIENT
Start: 2019-01-31 | End: 2019-03-18 | Stop reason: SDUPTHER

## 2019-03-06 ENCOUNTER — OFFICE VISIT (OUTPATIENT)
Dept: NEUROLOGY | Age: 19
End: 2019-03-06

## 2019-03-06 VITALS
BODY MASS INDEX: 44.41 KG/M2 | OXYGEN SATURATION: 98 % | HEART RATE: 79 BPM | SYSTOLIC BLOOD PRESSURE: 120 MMHG | WEIGHT: 293 LBS | RESPIRATION RATE: 16 BRPM | HEIGHT: 68 IN | DIASTOLIC BLOOD PRESSURE: 78 MMHG

## 2019-03-06 DIAGNOSIS — R20.2 NUMBNESS AND TINGLING OF BOTH LEGS: ICD-10-CM

## 2019-03-06 DIAGNOSIS — R29.898 WEAKNESS OF BOTH LEGS: ICD-10-CM

## 2019-03-06 DIAGNOSIS — R29.6 FALLS FREQUENTLY: ICD-10-CM

## 2019-03-06 DIAGNOSIS — R20.0 NUMBNESS AND TINGLING OF BOTH LEGS: ICD-10-CM

## 2019-03-06 DIAGNOSIS — R51.9 WORSENING HEADACHES: Primary | ICD-10-CM

## 2019-03-06 DIAGNOSIS — R93.0 ABNORMAL CT OF THE HEAD: ICD-10-CM

## 2019-03-06 DIAGNOSIS — R51.9 WORSENING HEADACHES: ICD-10-CM

## 2019-03-06 NOTE — PROGRESS NOTES
Chief Complaint   Patient presents with    New Patient    Headache     contantHA with occational  \"ice pick\" left temple,  with frequent falls, loss of sensation in legs approx 1.5 wks. CT done at [facility], report brought with pt.

## 2019-03-06 NOTE — PROGRESS NOTES
[facility] and [address] at [facility] at [address] [facility], [address] [facility] [address] [facility], [address] [place] [address]  [phone] Office  [phone] Facsimile           Referring: ER    Chief Complaint   Patient presents with    New Patient    Headache     contantHA with occational  \"ice pick\" left temple,  with frequent falls, loss of sensation in legs approx 1.5 wks. CT done at [facility], report brought with pt.       40-year-old right-handed woman who presents today for frequent falls loss of feeling in her legs and CT indicates tumor. She is accompanied by her mother. She notes that she has had constant headaches that she calls ice pick headaches. She notes that she has been having multitude of symptoms. She has been having frontal headaches that she describes as feeling like an ice pick is in her head. She notes that these of been ongoing for a number of weeks. She is a difficult historian and she is tangential and difficult to follow the history what is I can and she has these frequent headaches that she notes are constant but seem to vary in intensity and at times they become quite severe that feels like an ice pick is in the frontal portion of her head. She notes that there is no inciting factor for this. She has also been feeling as though she has numbness in her legs and she is unable to ambulate because of this. Her legs will go numb left greater than right. She says numbness is a loss of feeling in it all comes on it wants and at times she can feel her legs and then at times she cannot feel them at all. It sometimes is the right side only sometimes on the left. She says it is random. Sometimes she is walking perfectly fine and then all of a sudden her legs go numb when she will fall. Interestingly she has no bowel or bladder loss with this.   This been ongoing for 1 or 2 weeks now.  There were no changes in the last 1 to 2 weeks. She had all of this component once with the headaches and with the falling. She says she just falls down without knowing. She is more concerned about the falling than the headache. [facility] and she [facility] head CT and she says she was told that she had a brain tumor. She saw Dr. Angelita Santo who told her she needed to go see neurosurgery. I do have Dr. Guy Cruz note or at least a portion of his note i.e. the after visit summary where she was ordered an MRI of the brain. This apparently scheduled for tomorrow. She also has discharge instructions from [facility] where she was seen in the emergency department for visit reason of ground-level fall with paralysis. She says she was seen by the neurologist they are on call and was discharged. There she had a CT of the abdomen and pelvis as well as a CT of the cervical spine and a CT of the chest for trauma all of those were normal.  She had laboratory analyses that were normal.  He was discharged from the [facility] emergency as well. The CT scan from an [facility] was said to show a focus of hyperdensity along the superior aspect of the third ventricle just posterior to the foramen of Kaila Crate that was poorly seen and were worrisome for a tiny colloid cyst given the history of headaches and syncope and they recommended a further evaluation with an MRI scan. That took them to the referral to see Dr. Theo Dawson. Again both mother and patient note they saw Dr. Theo Dawson who ordered the 10 and consequently they note they came here even though the MRI scan was ordered for tomorrow because Dr. Theo Dawson indicated they should see a neurosurgeon. They do know that I am not a neurosurgeon but a neurologist as is Dr. Theo Dawson. Patient endorses anxiety and depression as well as the falls and fatigues and headache and nausea and joint pain and ringing in the ears.     Past Medical History: Diagnosis Date    Adverse effect of anesthesia     REQUIRES A LOT OF ANESTHESIA PER MOTHER    Anemia     Anxiety     Attention deficit disorder (ADD)     Closed fracture of metatarsal neck 11/11/2015    [facility], 9/15/15 with Dr. Graham Castillo alignment and healing     Conversion disorder     Depression     Hx of seasonal allergies     Hypothyroidism 1/8/2014    Kyphosis     Kyphosis     Obesity     Scoliosis     Scoliosis     Snoring     Vision decreased     Vitamin D deficiency 1/8/2014    Took 12 weeks of weekly 02524 international units of vitamin d and now on 2000 international units  daily          Past Surgical History:   Procedure Laterality Date    HX BACK SURGERY  2009    Thoracic cage with screws    HX KNEE ARTHROSCOPY  01/2017    LEFT KNEE       Current Outpatient Medications   Medication Sig Dispense Refill    SUMAtriptan (IMITREX) 25 mg tablet Take 1 Tab by mouth as needed for Migraine for up to 1 dose. 9 Tab 0    levothyroxine (SYNTHROID) 200 mcg tablet TAKE 1 TABLET BY MOUTH ONCE DAILY ALONG WITH 50 MCG LEVOTHYROXINE TABLET 90 Tab 0    DULoxetine (CYMBALTA) 60 mg capsule TAKE ONE CAPSULE BY MOUTH DAILY 90 Cap 1    montelukast (SINGULAIR) 10 mg tablet TAKE ONE TABLET BY MOUTH DAILY 90 Tab 1    levothyroxine (SYNTHROID) 50 mcg tablet TAKE ONE TABLET BY MOUTH ONCE DAILY ALONG WITH 200 MCG TABLET 90 Tab 0    NUVARING 0.12-0.015 mg/24 hr vaginal ring INSERT VAGINALY X 3 WEEKS, REMOVE FOR 1 WEEK, THEN REPEAT  11    ibuprofen (MOTRIN) 800 mg tablet 800 mg every eight (8) hours as needed.  cholecalciferol, vitamin D3, (VITAMIN D3) 2,000 unit tab Take 2,000 Units by mouth daily.  FERROUS FUMARATE (IRON PO) Take  by mouth.  fexofenadine (ALLEGRA) 180 mg tablet Take 180 mg by mouth daily as needed.  oxyCODONE-acetaminophen (PERCOCET) 5-325 mg per tablet Take 1 Tab by mouth every four (4) hours as needed for Pain. Max Daily Amount: 6 Tabs.  (Patient not taking: Reported on 3/6/2019) 5 Tab 0    EPIPEN 2-JOSE 0.3 mg/0.3 mL injection   1        Allergies   Allergen Reactions    Hazelnut Anaphylaxis    Adhesive Tape-Silicones Itching     Paper tape    [place] Swelling    Grass Pollen Unknown (comments)     + testing       Social History     Tobacco Use    Smoking status: Never Smoker    Smokeless tobacco: Never Used   Substance Use Topics    Alcohol use: No    Drug use: No       Family History   Problem Relation Age of Onset    Heart Failure Maternal Grandmother     Hypertension Maternal Grandmother     Asthma Maternal Grandmother     Cancer Paternal Grandmother         lung    Arthritis-osteo Mother         psoriatic    Thyroid Disease Mother         Nuha Likens    Diabetes Maternal Grandfather     Heart Disease Maternal Grandfather     Diabetes Paternal Grandfather     No Known Problems Father     No Known Problems Sister     No Known Problems Brother     No Known Problems Maternal Aunt     No Known Problems Maternal Uncle     No Known Problems Paternal Aunt     No Known Problems Paternal Uncle     No Known Problems Other        Review of Systems  Review of systems is considered suspect as diffuse positive answers are present. Examination  Visit Vitals  /78 (BP 1 Location: Left arm, BP Patient Position: Sitting)   Pulse 79   Resp 16   Ht 5' 7.5\" (1.715 m)   Wt 142.4 kg (314 lb)   LMP 02/16/2019 (Exact Date)   SpO2 98%   BMI 48.45 kg/m²     She is an obese woman with a very odd affect. She is appropriately dressed. She has a supple neck without bruit. Her heart is regular with symmetrical pulses. She has no edema of the lower extremities. Neurologically she is awake alert oriented and conversant. She has normal speech-language cognition and attention. Her cranial nerves are intact 2-12 without nystagmus. Her visual fields are full to direct and her optic disks are sharp bilaterally.   She has no abnormality of bulk or tone and she has no abnormal movement. She has no pronation or drift. She resists fully in the upper and lower extremities in all muscle groups to direct testing. Her reflexes are symmetrical in the upper and lower extremities bilaterally. No pathologic reflexes are elicited. She has no ataxia. Sensory is intact to primary. Although she is in a wheelchair today she is able to ambulate. Impression/Plan  13-year-old young woman with complaints of headache and gait disturbance and sensory disturbance in the lower extremities and an abnormal head CT which does not really connect with her multitude of complaints and with a diffusely positive systems review all of which would seem to point more towards a supratentorial etiology for her complaints although that certainly is a diagnosis of exclusion. Given her complaints she should continue on with the MRI of the brain as ordered by Dr. Joan Villalobos and as is scheduled tomorrow. I asked her to make sure that her copy of that report is sent to me. Given her lower extremity complaints we will go and get an EMG of the lower extremities and I did discuss with our neuromuscular physicians and we are able to get her fit in for that test today while she is here. Given the falls and question syncope/presyncope etc. we will going get a carotid Doppler as well to be complete. Defer an EEG for now. We will await the MRI and EMG results. She will follow-up after those have been completed. Milton Fox MD    This note was created using voice recognition software. Despite editing, there may be syntax errors. This note will not be viewable in [address]

## 2019-03-08 ENCOUNTER — OFFICE VISIT (OUTPATIENT)
Dept: NEUROLOGY | Age: 19
End: 2019-03-08

## 2019-03-08 DIAGNOSIS — R20.2 NUMBNESS AND TINGLING OF BOTH LEGS: Primary | ICD-10-CM

## 2019-03-08 DIAGNOSIS — R20.0 NUMBNESS AND TINGLING OF BOTH LEGS: Primary | ICD-10-CM

## 2019-03-08 NOTE — PROCEDURES
EMG/ NCS Report  [facility], [address]  Ph: [phone]/ [phone]  FAX: [phone]/ [phone]  Test Date:  3/8/2019    Patient: Lily Johnston : 1991 Physician: Samantha Crane MD   Sex: Female Height: ' \" Ref Phys: Kaylyn Menezes MD   ID#: [id number] Weight:  lbs. Technician: Michael Godwin     Patient History / Exam:  CC:WEAKNESS MERCEDEZ. LOWERS. SYMPTOMS X 2 WKS; L worse than R. Patient is coming for radiculopathy evaluation      EMG & NCV Findings:  Evaluation of the Left Fibular motor and the Right Fibular motor nerves showed normal distal onset latency (L4.6, R4.6 ms), normal amplitude (L10.1, R9.0 mV), normal conduction velocity (B Fib-Ankle, L50, R47 m/s), and normal conduction velocity (Poplt-B Fib, L45, R59 m/s). The Left tibial motor and the Right tibial motor nerves showed normal distal onset latency (L4.1, R4.6 ms), normal amplitude (L12.4, R12.1 mV), and normal conduction velocity (Knee-Ankle, L44, R45 m/s). The Left Sup Fibular sensory, the Right Sup Fibular sensory, the Left sural sensory, and the Right sural sensory nerves showed normal distal peak latency (L2.8, R2.8, L3.8, R4.0 ms) and normal amplitude (L27.8, R16.4, L13.8, R29.3 µV). All F Wave latencies were within normal limits. All examined muscles (as indicated in the following table) showed no evidence of electrical instability. Impression:    Extensive electrodiagnostic examination of the left lower extremity, with additional nerve conduction study of the right lower extremity, is normal.    Patient unable to tolerate needle examination and declined needle testing of the right lower extremity.         Samantha Crane MD  Diplomate, American Board of Psychiatry and Neurology  Diplomate, Neuromuscular Medicine  Diplomate, American Board of Electrodiagnostic Medicine  Director, [address] [place] [address] Accredited Laboratory with Exemplary Status        Nerve Conduction Studies  Anti Sensory Summary Table     Site NR Peak (ms) Norm Peak (ms) P-T Amp (µV) Norm P-T Amp Site1 Site2 Dist (cm)   Left Sup Fibular Anti Sensory (Lat ankle)  30.6°C   Lower leg    2.8 <4.4 27.8 >6 Lower leg Lat ankle 10.0   Right Sup Fibular Anti Sensory (Lat ankle)  28.8°C   Lower leg    2.8 <4.4 16.4 >6 Lower leg Lat ankle 10.0   Left Sural Anti Sensory (Lat Mall)  30.7°C   Calf    3.8 <4.5 13.8 >4.0 Calf Lat Mall 14.0   Right Sural Anti Sensory (Lat Mall)  30.6°C   Calf    4.0 <4.5 29.3 >4.0 Calf Lat Mall 14.0     Motor Summary Table     Site NR Onset (ms) Norm Onset (ms) O-P Amp (mV) Norm O-P Amp Amp% (Prev) Site1 Site2 Dist (cm) Yasmany (m/s) Norm Yasmany (m/s)   Left Fibular Motor (Ext Dig Brev)  30.5°C   Ankle    4.6 <6.5 10.1 >2.6 100.0 Ankle Ext Dig Brev 8.0     B Fib    11.0  8.8  87.1 B Fib Ankle 32.0 50 >38   Poplt    13.2  9.1  103.4 Poplt B Fib 10.0 45 >38   Right Fibular Motor (Ext Dig Brev)  28.4°C   Ankle    4.6 <6.5 9.0 >2.6 100.0 Ankle Ext Dig Brev 8.0     B Fib    11.9  8.0  88.9 B Fib Ankle 34.0 47 >38   Poplt    13.6  6.7  83.8 Poplt B Fib 10.0 59 >38   Left Tibial Motor (Abd Prado Brev)  30.3°C   Ankle    4.1 <6.1 12.4 >5.8 100.0 Ankle Abd Prado Brev 8.0     Knee    12.2  10.7  86.3 Knee Ankle 36.0 44 >44   Right Tibial Motor (Abd Prado Brev)  28.3°C   Ankle    4.6 <6.1 12.1 >5.8 100.0 Ankle Abd Prado Brev 8.0     Knee    12.9  7.1  58.7 Knee Ankle 37.0 45 >44     F Wave Studies     NR F-Lat (ms) Lat Norm (ms) L-R F-Lat (ms) L-R Lat Norm   Left Tibial (Mrkrs) (Abd Hallucis)  30.1°C      44.80 <56 4.51 <5.7   Right Tibial (Mrkrs) (Abd Hallucis)  28.3°C      49.31 <56 4.51 <5.7     H Reflex Studies     NR H-Lat (ms) L-R H-Lat (ms) L-R Lat Norm   Left Tibial (Gastroc)  30°C      29.10  <2.0     EMG     Side Muscle Nerve Root Ins Act Fibs Psw Recrt Duration Amp Poly Comment   Left Ext Dig Brev Dp Br Peron L5, S1 Nml Nml Nml Nml Nml Nml Nml    Left AbdHallucis MedPlantar S1-2 Nml Nml Nml Nml Nml Nml Nml    Left AntTibialis Dp Br Peron L4-5 Nml Nml Nml Nml Nml Nml Nml    Left MedGastroc Tibial S1-2 Nml Nml Nml Nml Nml Nml Nml    Left VastusLat Femoral L2-4 Nml Nml Nml Nml Nml Nml Nml                Nerve Conduction Studies  Anti Sensory Left/Right Comparison     Site L Lat (ms) R Lat (ms) L-R Lat (ms) L Amp (µV) R Amp (µV) L-R Amp (%) Site1 Site2 L Yasmany (m/s) R Yasmany (m/s) L-R Yasmany (m/s)   Sup Fibular Anti Sensory (Lat ankle)  30.6°C   Lower leg 2.2 1.9 0.3 27.8 16.4 41.0 Lower leg Lat ankle 45 53 8   Sural Anti Sensory (Lat Mall)  30.7°C   Calf 3.2 3.3 0.1 13.8 29.3 52.9 Calf Lat Mall 44 42 2     Motor Left/Right Comparison     Site L Lat (ms) R Lat (ms) L-R Lat (ms) L Amp (mV) R Amp (mV) L-R Amp (%) Site1 Site2 L Yasmany (m/s) R Yasmany (m/s) L-R Yasmany (m/s)   Fibular Motor (Ext Dig Brev)  30.5°C   Ankle 4.6 4.6 0.0 10.1 9.0 10.9 Ankle Ext Dig Brev      B Fib 11.0 11.9 0.9 8.8 8.0 9.1 B Fib Ankle 50 47 3   Poplt 13.2 13.6 0.4 9.1 6.7 26.4 Poplt B Fib 45 59 14   Tibial Motor (Abd Prado Brev)  30.3°C   Ankle 4.1 4.6 0.5 12.4 12.1 2.4 Ankle Abd Prado Brev      Knee 12.2 12.9 0.7 10.7 7.1 33.6 Knee Ankle 44 45 1         Waveforms:

## 2019-03-11 ENCOUNTER — OFFICE VISIT (OUTPATIENT)
Dept: NEUROLOGY | Age: 19
End: 2019-03-11

## 2019-03-11 VITALS
WEIGHT: 293 LBS | TEMPERATURE: 98.4 F | RESPIRATION RATE: 20 BRPM | SYSTOLIC BLOOD PRESSURE: 112 MMHG | BODY MASS INDEX: 44.41 KG/M2 | HEIGHT: 68 IN | HEART RATE: 84 BPM | OXYGEN SATURATION: 98 % | DIASTOLIC BLOOD PRESSURE: 76 MMHG

## 2019-03-11 DIAGNOSIS — R68.89 SOMATIC COMPLAINTS, MULTIPLE: Primary | ICD-10-CM

## 2019-03-11 NOTE — PROGRESS NOTES
[facility]rology [facility] and [address] at [facility] Neurology Clinics at [address] [facility], [address] [facility] [address] [facility], [address] [place] [address]   [phone]              No chief complaint on file. Current Outpatient Medications   Medication Sig Dispense Refill    SUMAtriptan (IMITREX) 25 mg tablet Take 1 Tab by mouth as needed for Migraine for up to 1 dose. 9 Tab 0    levothyroxine (SYNTHROID) 200 mcg tablet TAKE 1 TABLET BY MOUTH ONCE DAILY ALONG WITH 50 MCG LEVOTHYROXINE TABLET 90 Tab 0    oxyCODONE-acetaminophen (PERCOCET) 5-325 mg per tablet Take 1 Tab by mouth every four (4) hours as needed for Pain. Max Daily Amount: 6 Tabs. (Patient not taking: Reported on 3/6/2019) 5 Tab 0    DULoxetine (CYMBALTA) 60 mg capsule TAKE ONE CAPSULE BY MOUTH DAILY 90 Cap 1    montelukast (SINGULAIR) 10 mg tablet TAKE ONE TABLET BY MOUTH DAILY 90 Tab 1    levothyroxine (SYNTHROID) 50 mcg tablet TAKE ONE TABLET BY MOUTH ONCE DAILY ALONG WITH 200 MCG TABLET 90 Tab 0    NUVARING 0.12-0.015 mg/24 hr vaginal ring INSERT VAGINALY X 3 WEEKS, REMOVE FOR 1 WEEK, THEN REPEAT  11    ibuprofen (MOTRIN) 800 mg tablet 800 mg every eight (8) hours as needed.  cholecalciferol, vitamin D3, (VITAMIN D3) 2,000 unit tab Take 2,000 Units by mouth daily.  FERROUS FUMARATE (IRON PO) Take  by mouth.  EPIPEN 2-JOSE 0.3 mg/0.3 mL injection   1    fexofenadine (ALLEGRA) 180 mg tablet Take 180 mg by mouth daily as needed.         Allergies   Allergen Reactions    Hazelnut Anaphylaxis    Adhesive Tape-Silicones Itching     Paper tape    [place] Swelling    Grass Pollen Unknown (comments)     + testing     Social History     Tobacco Use    Smoking status: Never Smoker    Smokeless tobacco: Never Used   Substance Use Topics    Alcohol use: No    Drug use: No   Patient was just seen 5 days ago for her planes of constant headache frequent falls and loss of feeling in her legs with CT scan suggesting a question of colloid cyst.  She was supposed to be getting an MRI of the brain done at [facility] the day after that visit. We did receive the report of that and I reviewed it today. That report indicates the MRI was completely normal.  There is no abnormality at all in the brain. She did have an EMG done on March 8 and the left lower extremity was normal and the nerve conduction study of the right lower extremity was normal but she was unable to tolerate the needle examination on the right and she declined having that done. Doppler personally reviewed and unrevealing. Her mother then tells me that she remembers the patient had an x-ray of her low back that showed she had some bulging disc and the patient indicates that it was more than just one disc that she had multiple bulging disks. I again reviewed that her EMG did not demonstrate any evidence of root compression. I reassured the patient and her mother that her neurologic evaluation has been normal.  Her examination is normal.  MRI is normal of the brain and her examination is normal along with Doppler and EMG. We discussed that it is a bit frustrating that she has this multitude of symptoms and normal evaluation and again we should be reassured that nothing ominous is ongoing. She will return as needed      Total time: 15 min   Counseling / coordination time: 15 min   > 50% counseling / coordination?: Yes re: as documented above    Vitaliy Persaud MD  This note will not be viewable in [address] Examination  Visit Vitals  LMP 02/16/2019 (Exact Date)         Impression/Plan      Vitaliy Persaud MD      This note was created using voice recognition software. Despite editing, there may be syntax errors.

## 2019-03-11 NOTE — PROGRESS NOTES
Chief Complaint   Patient presents with    Results     f/u, mom reports \"falling a few more times\" since LOV mom states back xr showed bulging disc btw L3-L4     Coordination of Care Questions    1. Have you been to the ER, urgent care clinic outside of [address] [place] [address] since your last visit? No       Hospitalized since your last visit? No    2. Have you seen or consulted any other health care providers outside of the [address] [place] [address] since your last visit? Include any pap smears or colon screening.  No

## 2019-03-15 ENCOUNTER — OFFICE VISIT (OUTPATIENT)
Dept: FAMILY MEDICINE CLINIC | Age: 19
End: 2019-03-15

## 2019-03-15 VITALS
TEMPERATURE: 98.4 F | SYSTOLIC BLOOD PRESSURE: 114 MMHG | HEIGHT: 68 IN | WEIGHT: 293 LBS | HEART RATE: 95 BPM | OXYGEN SATURATION: 98 % | BODY MASS INDEX: 44.41 KG/M2 | RESPIRATION RATE: 18 BRPM | DIASTOLIC BLOOD PRESSURE: 78 MMHG

## 2019-03-15 DIAGNOSIS — E66.01 MORBID OBESITY WITH BMI OF 45.0-49.9, ADULT (HCC): ICD-10-CM

## 2019-03-15 DIAGNOSIS — Z98.890 HISTORY OF BACK SURGERY: ICD-10-CM

## 2019-03-15 DIAGNOSIS — G43.809 OTHER MIGRAINE WITHOUT STATUS MIGRAINOSUS, NOT INTRACTABLE: ICD-10-CM

## 2019-03-15 DIAGNOSIS — E03.9 ACQUIRED HYPOTHYROIDISM: Primary | ICD-10-CM

## 2019-03-15 DIAGNOSIS — E55.9 VITAMIN D DEFICIENCY: ICD-10-CM

## 2019-03-15 DIAGNOSIS — G62.9 NEUROPATHY: ICD-10-CM

## 2019-03-15 RX ORDER — RIBOFLAVIN (VITAMIN B2) 400 MG
400 TABLET ORAL DAILY
Qty: 90 TAB | Refills: 0
Start: 2019-03-15 | End: 2022-04-01

## 2019-03-15 RX ORDER — RIZATRIPTAN BENZOATE 10 MG/1
10 TABLET ORAL
Qty: 10 TAB | Refills: 0 | Status: SHIPPED | OUTPATIENT
Start: 2019-03-15 | End: 2019-07-17 | Stop reason: SDUPTHER

## 2019-03-15 NOTE — ASSESSMENT & PLAN NOTE
Uncontrolled, based on history, physical exam and review of pertinent labs, studies and medications; meds reconciled; continue current treatment plan, lifestyle modifications recommended.   Key Obesity Meds             levothyroxine (SYNTHROID) 200 mcg tablet  (Taking) TAKE 1 TABLET BY MOUTH ONCE DAILY ALONG WITH 50 MCG LEVOTHYROXINE TABLET    levothyroxine (SYNTHROID) 50 mcg tablet  (Taking) TAKE ONE TABLET BY MOUTH ONCE DAILY ALONG WITH 200 MCG TABLET        Lab Results   Component Value Date/Time    Hemoglobin A1c 4.4 02/23/2018 01:34 PM    Cholesterol, total CANCELED 02/23/2018 01:34 PM    HDL Cholesterol CANCELED 02/23/2018 01:34 PM    Triglyceride CANCELED 02/23/2018 01:34 PM    TSH 0.327 03/12/2018 03:07 PM    VITAMIN D, 25-HYDROXY CANCELED 02/23/2018 01:34 PM

## 2019-03-15 NOTE — PATIENT INSTRUCTIONS
Migraine Headache: Care Instructions  Your Care Instructions  Migraines are painful, throbbing headaches that often start on one side of the head. They may cause nausea and vomiting and make you sensitive to light, sound, or smell. Without treatment, migraines can last from 4 hours to a few days. Medicines can help prevent migraines or stop them after they have started. Your doctor can help you find which ones work best for you. Follow-up care is a key part of your treatment and safety. Be sure to make and go to all appointments, and call your doctor if you are having problems. It's also a good idea to know your test results and keep a list of the medicines you take. How can you care for yourself at home? · Do not drive if you have taken a prescription pain medicine. · Rest in a quiet, dark room until your headache is gone. Close your eyes, and try to relax or go to sleep. Don't watch TV or read. · Put a cold, moist cloth or cold pack on the painful area for 10 to 20 minutes at a time. Put a thin cloth between the cold pack and your skin. · Use a warm, moist towel or a heating pad set on low to relax tight shoulder and neck muscles. · Have someone gently massage your neck and shoulders. · Take your medicines exactly as prescribed. Call your doctor if you think you are having a problem with your medicine. You will get more details on the specific medicines your doctor prescribes. · Be careful not to take pain medicine more often than the instructions allow. You could get worse or more frequent headaches when the medicine wears off. To prevent migraines  · Keep a headache diary so you can figure out what triggers your headaches. Avoiding triggers may help you prevent headaches. Record when each headache began, how long it lasted, and what the pain was like.  (Was it throbbing, aching, stabbing, or dull?) Write down any other symptoms you had with the headache, such as nausea, flashing lights or dark spots, or sensitivity to bright light or loud noise. Note if the headache occurred near your period. List anything that might have triggered the headache. Triggers may include certain foods (chocolate, cheese, wine) or odors, smoke, bright light, stress, or lack of sleep. · If your doctor has prescribed medicine for your migraines, take it as directed. You may have medicine that you take only when you get a migraine and medicine that you take all the time to help prevent migraines. ? If your doctor has prescribed medicine for when you get a headache, take it at the first sign of a migraine, unless your doctor has given you other instructions. ? If your doctor has prescribed medicine to prevent migraines, take it exactly as prescribed. Call your doctor if you think you are having a problem with your medicine. · Find healthy ways to deal with stress. Migraines are most common during or right after stressful times. Take time to relax before and after you do something that has caused a migraine in the past.  · Try to keep your muscles relaxed by keeping good posture. Check your jaw, face, neck, and shoulder muscles for tension. Try to relax them. When you sit at a desk, change positions often. And make sure to stretch for 30 seconds each hour. · Get plenty of sleep and exercise. · Eat meals on a regular schedule. Avoid foods and drinks that often trigger migraines. These include chocolate, alcohol (especially red wine and port), aspartame, monosodium glutamate (MSG), and some additives found in foods (such as hot dogs, schmidt, cold cuts, aged cheeses, and pickled foods). · Limit caffeine. Don't drink too much coffee, tea, or soda. But don't quit caffeine suddenly. That can also give you migraines. · Do not smoke or allow others to smoke around you. If you need help quitting, talk to your doctor about stop-smoking programs and medicines. These can increase your chances of quitting for good.   · If you are taking birth control pills or hormone therapy, talk to your doctor about whether they are triggering your migraines. When should you call for help? Call 911 anytime you think you may need emergency care. For example, call if:    · You have signs of a stroke. These may include:  ? Sudden numbness, paralysis, or weakness in your face, arm, or leg, especially on only one side of your body. ? Sudden vision changes. ? Sudden trouble speaking. ? Sudden confusion or trouble understanding simple statements. ? Sudden problems with walking or balance. ? A sudden, severe headache that is different from past headaches.    Call your doctor now or seek immediate medical care if:    · You have new or worse nausea and vomiting.     · You have a new or higher fever.     · Your headache gets much worse.    Watch closely for changes in your health, and be sure to contact your doctor if:    · You are not getting better after 2 days (48 hours). Where can you learn more? Go to http://angelito-brandon.info/. Enter G718 in the search box to learn more about \"Migraine Headache: Care Instructions. \"  Current as of: Yanely 3, 2018  Content Version: 11.9  © 0882-5240 [employer]. Care instructions adapted under license by [employer] (which disclaims liability or warranty for this information). If you have questions about a medical condition or this instruction, always ask your healthcare professional. [address] any warranty or liability for your use of this information.

## 2019-03-15 NOTE — PROGRESS NOTES
Chief Complaint   Patient presents with    Fall     patient reports falling almost everyday     Headache     1. Have you been to the ER, urgent care clinic since your last visit? Hospitalized since your last visit? Yes, patient went to [facility] RE due to headaches and falls last week. 2. Have you seen or consulted any other health care providers outside of the [address] [place] [address] since your last visit? Include any pap smears or colon screening. No      Patient stated that she falls almost everyday, and sometimes more than one fall in a day.

## 2019-03-16 LAB
25(OH)D3+25(OH)D2 SERPL-MCNC: 33.2 NG/ML (ref 30–100)
ALBUMIN SERPL-MCNC: 4.4 G/DL (ref 3.5–5.5)
ALBUMIN/GLOB SERPL: 1.7 {RATIO} (ref 1.2–2.2)
ALP SERPL-CCNC: 86 IU/L (ref 39–117)
ALT SERPL-CCNC: 15 IU/L (ref 0–32)
AST SERPL-CCNC: 14 IU/L (ref 0–40)
BILIRUB SERPL-MCNC: 0.4 MG/DL (ref 0–1.2)
BUN SERPL-MCNC: 8 MG/DL (ref 6–20)
BUN/CREAT SERPL: 14 (ref 9–23)
CALCIUM SERPL-MCNC: 9.4 MG/DL (ref 8.7–10.2)
CHLORIDE SERPL-SCNC: 99 MMOL/L (ref 96–106)
CK SERPL-CCNC: 48 U/L (ref 24–173)
CO2 SERPL-SCNC: 23 MMOL/L (ref 20–29)
CREAT SERPL-MCNC: 0.59 MG/DL (ref 0.57–1)
ERYTHROCYTE [DISTWIDTH] IN BLOOD BY AUTOMATED COUNT: 13.9 % (ref 12.3–15.4)
FOLATE SERPL-MCNC: 6.7 NG/ML
GLOBULIN SER CALC-MCNC: 2.6 G/DL (ref 1.5–4.5)
GLUCOSE SERPL-MCNC: 86 MG/DL (ref 65–99)
HCT VFR BLD AUTO: 39 % (ref 34–46.6)
HGB BLD-MCNC: 12.7 G/DL (ref 11.1–15.9)
MCH RBC QN AUTO: 28.3 PG (ref 26.6–33)
MCHC RBC AUTO-ENTMCNC: 32.6 G/DL (ref 31.5–35.7)
MCV RBC AUTO: 87 FL (ref 79–97)
PLATELET # BLD AUTO: 211 X10E3/UL (ref 150–379)
POTASSIUM SERPL-SCNC: 4.3 MMOL/L (ref 3.5–5.2)
PROT SERPL-MCNC: 7 G/DL (ref 6–8.5)
RBC # BLD AUTO: 4.48 X10E6/UL (ref 3.77–5.28)
SODIUM SERPL-SCNC: 140 MMOL/L (ref 134–144)
T3FREE SERPL-MCNC: 2.8 PG/ML (ref 2–4.4)
T4 FREE SERPL-MCNC: 0.86 NG/DL (ref 0.82–1.77)
THYROGLOB AB SERPL-ACNC: 2.6 IU/ML (ref 0–0.9)
THYROPEROXIDASE AB SERPL-ACNC: 327 IU/ML (ref 0–34)
TSH SERPL DL<=0.005 MIU/L-ACNC: 39.11 UIU/ML (ref 0.45–4.5)
VIT B12 SERPL-MCNC: 193 PG/ML (ref 232–1245)
WBC # BLD AUTO: 7.7 X10E3/UL (ref 3.4–10.8)

## 2019-03-18 DIAGNOSIS — E03.9 ACQUIRED HYPOTHYROIDISM: ICD-10-CM

## 2019-03-18 RX ORDER — LEVOTHYROXINE SODIUM 200 UG/1
TABLET ORAL
Qty: 90 TAB | Refills: 0 | Status: SHIPPED | OUTPATIENT
Start: 2019-03-18 | End: 2019-06-09 | Stop reason: SDUPTHER

## 2019-03-18 RX ORDER — LEVOTHYROXINE SODIUM 50 UG/1
TABLET ORAL
Qty: 90 TAB | Refills: 0 | Status: SHIPPED | OUTPATIENT
Start: 2019-03-18 | End: 2019-06-09 | Stop reason: SDUPTHER

## 2019-03-18 RX ORDER — LANOLIN ALCOHOL/MO/W.PET/CERES
CREAM (GRAM) TOPICAL
Qty: 90 TAB | Refills: 1 | Status: SHIPPED | OUTPATIENT
Start: 2019-03-18 | End: 2022-04-01

## 2019-03-18 NOTE — PROGRESS NOTES
Please notify patient regarding their test results:    Blood work does confirm presence of Hashimoto's disease which is causing her hypothyroidism. Her thyroid levels are very out of range but likely due to not taking meds consistently. Would keep the same dose at 250 mcg daily and pt to come by lab in one month to recheck levels. Thyroid medication should be taken on an empty stomach with water, ideally an hour before breakfast.  Vitamin B12 levels are low. Rx sent for B12 supplementation.

## 2019-03-25 NOTE — PROGRESS NOTES
Have attempted to contact patient regarding lab results. No return call after 3 attempts. Results letter printed and mailed.

## 2019-05-01 DIAGNOSIS — Z88.9 HX OF SEASONAL ALLERGIES: ICD-10-CM

## 2019-05-01 NOTE — TELEPHONE ENCOUNTER
Pharmacy requesting medication refill 90 day supply     Requested Prescriptions     Pending Prescriptions Disp Refills    montelukast (SINGULAIR) 10 mg tablet 90 Tab 1     Pharmacy on file verified  [phone])

## 2019-05-02 RX ORDER — MONTELUKAST SODIUM 10 MG/1
TABLET ORAL
Qty: 90 TAB | Refills: 1 | Status: SHIPPED | OUTPATIENT
Start: 2019-05-02 | End: 2019-10-29 | Stop reason: SDUPTHER

## 2019-06-28 ENCOUNTER — OFFICE VISIT (OUTPATIENT)
Dept: FAMILY MEDICINE CLINIC | Age: 19
End: 2019-06-28

## 2019-06-28 VITALS
HEIGHT: 68 IN | RESPIRATION RATE: 18 BRPM | TEMPERATURE: 98 F | OXYGEN SATURATION: 98 % | BODY MASS INDEX: 44.41 KG/M2 | WEIGHT: 293 LBS | DIASTOLIC BLOOD PRESSURE: 68 MMHG | HEART RATE: 79 BPM | SYSTOLIC BLOOD PRESSURE: 118 MMHG

## 2019-06-28 DIAGNOSIS — M54.6 CHRONIC MIDLINE THORACIC BACK PAIN: Primary | ICD-10-CM

## 2019-06-28 DIAGNOSIS — E03.9 ACQUIRED HYPOTHYROIDISM: ICD-10-CM

## 2019-06-28 DIAGNOSIS — E53.8 B12 DEFICIENCY: ICD-10-CM

## 2019-06-28 DIAGNOSIS — G89.29 CHRONIC MIDLINE THORACIC BACK PAIN: Primary | ICD-10-CM

## 2019-06-28 RX ORDER — CYCLOBENZAPRINE HCL 10 MG
10 TABLET ORAL AS NEEDED
COMMUNITY
Start: 2019-06-10 | End: 2022-04-01

## 2019-06-28 RX ORDER — FLUTICASONE PROPIONATE 50 MCG
2 SPRAY, SUSPENSION (ML) NASAL DAILY
COMMUNITY
Start: 2019-04-21 | End: 2022-04-01

## 2019-06-28 RX ORDER — BUPROPION HYDROCHLORIDE 100 MG/1
100 TABLET, EXTENDED RELEASE ORAL DAILY
COMMUNITY
End: 2019-08-06

## 2019-06-28 RX ORDER — DICLOFENAC SODIUM 75 MG/1
75 TABLET, DELAYED RELEASE ORAL AS NEEDED
COMMUNITY
Start: 2019-06-24 | End: 2022-04-01

## 2019-06-28 RX ORDER — LAMOTRIGINE 25 MG/1
100 TABLET ORAL DAILY
COMMUNITY
Start: 2019-06-26 | End: 2020-08-11

## 2019-06-28 NOTE — PROGRESS NOTES
Patient Name: Luis Armendariz   MRN: [id number]    Merry Cochran is a 19 y.o. female who presents with the following:     Continues to have chronic mid low back pain. Status post fusion from T3-L1 in 2010 for scoliosis and kyphosis. Saw Dr. Anabelle Elder in March and reportedly had an MRI which was normal.  Was suggested for her to lose weight and participate in water therapy but patient has not had time to do so. Only able to work about 4 hours at a time before pain sets in. Has not been consistent with taking her levothyroxine over the past month but recently reestablish care with her psychiatrist and has been started on Lamictal.  Is motivated to take her medications more consistently. History of vitamin B12 deficiency. Wt Readings from Last 3 Encounters:   06/28/19 332 lb (150.6 kg)   03/15/19 329 lb 6.4 oz (149.4 kg)   03/11/19 314 lb (142.4 kg)     Review of Systems   Constitutional: Negative for fever, malaise/fatigue and weight loss. Respiratory: Negative for cough, hemoptysis, shortness of breath and wheezing. Cardiovascular: Negative for chest pain, palpitations, leg swelling and PND. Gastrointestinal: Negative for abdominal pain, constipation, diarrhea, nausea and vomiting. Musculoskeletal: Positive for joint pain. The patient's medications, allergies, past medical history, surgical history, family history and social history were reviewed and updated where appropriate. Prior to Admission medications    Medication Sig Start Date End Date Taking? Authorizing Provider   cyclobenzaprine (FLEXERIL) 10 mg tablet Take 10 mg by mouth as needed. 6/10/19  Yes Provider, Historical   lamoTRIgine (LAMICTAL) 25 mg tablet Take 25 mg by mouth daily. 6/26/19  Yes Provider, Historical   fluticasone propionate (FLONASE) 50 mcg/actuation nasal spray 2 Sprays by Both Nostrils route daily.  4/21/19  Yes Provider, Historical   diclofenac EC (VOLTAREN) 75 mg EC tablet Take 75 mg by mouth as needed. 6/24/19  Yes Provider, Historical   buPROPion SR (WELLBUTRIN SR) 100 mg SR tablet Take 100 mg by mouth daily. Yes Provider, Historical   levothyroxine (SYNTHROID) 50 mcg tablet TAKE ONE TABLET BY MOUTH ONCE DAILY ALONG WITH 200 MCG TABLET 6/10/19  Yes Jignesh Ingram MD   levothyroxine (SYNTHROID) 200 mcg tablet TAKE 1 TABLET BY MOUTH ONCE DAILY ALONG WITH 50 MCG LEVOTHYROXINE TABLET 6/10/19  Yes Luis Alfredo Chavez MD   montelukast (SINGULAIR) 10 mg tablet TAKE ONE TABLET BY MOUTH DAILY 5/2/19  Yes Jignesh Ingram MD   cyanocobalamin (VITAMIN B-12) 1,000 mcg tablet Take 2 tabs daily for 2 weeks then take 1 tab daily  Patient taking differently: Take 1,000 mcg by mouth daily. Take 2 tabs daily for 2 weeks then take 1 tab daily 3/18/19  Yes Jignesh Ingram MD   riboflavin, vitamin B2, 400 mg tab Take 400 mg by mouth daily. 3/15/19  Yes Jignesh Ingram MD   DULoxetine (CYMBALTA) 60 mg capsule TAKE ONE CAPSULE BY MOUTH DAILY 5/4/18  Yes Jigensh Ingram MD   NUVARING 0.12-0.015 mg/24 hr vaginal ring INSERT VAGINALY X 3 WEEKS, REMOVE FOR 1 WEEK, THEN REPEAT 6/8/17  Yes Provider, Historical   cholecalciferol, vitamin D3, (VITAMIN D3) 2,000 unit tab Take 2,000 Units by mouth daily. Yes Provider, Historical   FERROUS FUMARATE (IRON PO) Take  by mouth daily. Yes Provider, Historical   EPIPEN 2-JOSE 0.3 mg/0.3 mL injection  5/6/16  Yes Provider, Historical   fexofenadine (ALLEGRA) 180 mg tablet Take 180 mg by mouth daily as needed.    Yes Provider, Historical       Allergies   Allergen Reactions    Hazelnut Anaphylaxis    Adhesive Tape-Silicones Itching     Paper tape    [place] Swelling    Grass Pollen Unknown (comments)     + testing           OBJECTIVE    Visit Vitals  /68 (BP 1 Location: Right arm, BP Patient Position: Sitting)   Pulse 79   Temp 98 °F (36.7 °C) (Oral)   Resp 18   Ht 5' 7.5\" (1.715 m)   Wt 332 lb (150.6 kg)   LMP 06/18/2019   SpO2 98%   BMI 51.23 kg/m²       Physical Exam Constitutional: She is oriented to person, place, and time and well-developed, well-nourished, and in no distress. No distress. Eyes: Pupils are equal, round, and reactive to light. Conjunctivae and EOM are normal.   Musculoskeletal: Normal range of motion. Neurological: She is alert and oriented to person, place, and time. Gait normal.   Skin: Skin is warm and dry. She is not diaphoretic. Psychiatric: Mood, memory, affect and judgment normal.   Nursing note and vitals reviewed. ASSESSMENT AND PLAN  Jah Lee is a 19 y.o. female who presents today for:    1. Chronic midline thoracic back pain  Recommend PT but pt does not have time. Encouraged her to follow up with ortho, possibly consider injections. Encourage weight loss. 2. Acquired hypothyroidism  Pt to return after taking med daily for one month. - TSH AND FREE T4    3. B12 deficiency  - VITAMIN B12 & FOLATE       There are no discontinued medications. Medication risks/benefits/costs/interactions/alternatives discussed with patient. Advised patient to call back or return to office if symptoms worsen/change/persist. If patient cannot reach us or should anything more severe/urgent arise he/she should proceed directly to the nearest emergency department. Discussed expected course/resolution/complications of diagnosis in detail with patient. Patient given a written after visit summary which includes his/her diagnoses, current medications and vitals. Patient expressed understanding with the diagnosis and plan. Luis Alfredo Adams M.D.

## 2019-06-28 NOTE — PROGRESS NOTES
Chief Complaint   Patient presents with    Back Pain     reports having back problems for years but pain has been severe in the past  2 months        1. Have you been to the ER, urgent care clinic since your last visit? Hospitalized since your last visit? No    2. Have you seen or consulted any other health care providers outside of the [address] [place] [address] since your last visit? Include any pap smears or colon screening.  No

## 2019-06-28 NOTE — PATIENT INSTRUCTIONS
Back Stretches: Exercises  Your Care Instructions  Here are some examples of exercises for stretching your back. Start each exercise slowly. Ease off the exercise if you start to have pain. Your doctor or physical therapist will tell you when you can start these exercises and which ones will work best for you. How to do the exercises  Overhead stretch    1. Stand comfortably with your feet shoulder-width apart. 2. Looking straight ahead, raise both arms over your head and reach toward the ceiling. Do not allow your head to tilt back. 3. Hold for 15 to 30 seconds, then lower your arms to your sides. 4. Repeat 2 to 4 times. Side stretch    1. Stand comfortably with your feet shoulder-width apart. 2. Raise one arm over your head, and then lean to the other side. 3. Slide your hand down your leg as you let the weight of your arm gently stretch your side muscles. Hold for 15 to 30 seconds. 4. Repeat 2 to 4 times on each side. Press-up    1. Lie on your stomach, supporting your body with your forearms. 2. Press your elbows down into the floor to raise your upper back. As you do this, relax your stomach muscles and allow your back to arch without using your back muscles. As your press up, do not let your hips or pelvis come off the floor. 3. Hold for 15 to 30 seconds, then relax. 4. Repeat 2 to 4 times. Relax and rest    1. Lie on your back with a rolled towel under your neck and a pillow under your knees. Extend your arms comfortably to your sides. 2. Relax and breathe normally. 3. Remain in this position for about 10 minutes. 4. If you can, do this 2 or 3 times each day. Follow-up care is a key part of your treatment and safety. Be sure to make and go to all appointments, and call your doctor if you are having problems. It's also a good idea to know your test results and keep a list of the medicines you take. Where can you learn more?   Go to http://angelito-brandon.info/. Enter N790 in the search box to learn more about \"Back Stretches: Exercises. \"  Current as of: September 20, 2018  Content Version: 11.9  © 7774-8261 [employer]. Care instructions adapted under license by [employer] (which disclaims liability or warranty for this information). If you have questions about a medical condition or this instruction, always ask your healthcare professional. [place] [address] any warranty or liability for your use of this information.

## 2019-07-17 DIAGNOSIS — G43.809 OTHER MIGRAINE WITHOUT STATUS MIGRAINOSUS, NOT INTRACTABLE: ICD-10-CM

## 2019-07-18 RX ORDER — RIZATRIPTAN BENZOATE 10 MG/1
TABLET ORAL
Qty: 10 TAB | Refills: 0 | Status: SHIPPED | OUTPATIENT
Start: 2019-07-18 | End: 2022-04-01

## 2019-08-06 ENCOUNTER — OFFICE VISIT (OUTPATIENT)
Dept: FAMILY MEDICINE CLINIC | Age: 19
End: 2019-08-06

## 2019-08-06 VITALS
DIASTOLIC BLOOD PRESSURE: 80 MMHG | OXYGEN SATURATION: 98 % | RESPIRATION RATE: 18 BRPM | HEIGHT: 68 IN | HEART RATE: 83 BPM | WEIGHT: 293 LBS | SYSTOLIC BLOOD PRESSURE: 108 MMHG | TEMPERATURE: 98.7 F | BODY MASS INDEX: 44.41 KG/M2

## 2019-08-06 DIAGNOSIS — E03.9 ACQUIRED HYPOTHYROIDISM: ICD-10-CM

## 2019-08-06 DIAGNOSIS — G47.9 SLEEP DISORDER: Primary | ICD-10-CM

## 2019-08-06 DIAGNOSIS — E53.8 B12 DEFICIENCY: ICD-10-CM

## 2019-08-06 NOTE — PROGRESS NOTES
Chief Complaint   Patient presents with    Sleep Problem     reports shaking uncontrollably to the point that wakes her up, has had nightmares x 2 couple of months    Headache     states that headaches happen after the shaking while sleeping x couple of months       1. Have you been to the ER, urgent care clinic since your last visit? Hospitalized since your last visit? No    2. Have you seen or consulted any other health care providers outside of the [address] [place] [address] since your last visit? Include any pap smears or colon screening.  No

## 2019-08-06 NOTE — PROGRESS NOTES
Patient Name: Alvin Costa   MRN: [id number]    Aida Werner is a 19 y.o. female who presents with the following:     She reports 1 month history of nightly episodes where as she is falling asleep, she is violently shaking for a few minutes and develops a residual headache afterwards. Also has noticed some stuttering. She is on Lamictal and Cymbalta, managed by psychiatry. She previously has seen neurology for numbness and tingling in both legs which had a normal EMG. No prior history of seizures or EEG. Has been taking her thyroid medication consistently. Review of Systems   Constitutional: Negative for fever, malaise/fatigue and weight loss. Respiratory: Negative for cough, hemoptysis, shortness of breath and wheezing. Cardiovascular: Negative for chest pain, palpitations, leg swelling and PND. Gastrointestinal: Negative for abdominal pain, constipation, diarrhea, nausea and vomiting. Neurological: Positive for sensory change and headaches. Negative for focal weakness and loss of consciousness. The patient's medications, allergies, past medical history, surgical history, family history and social history were reviewed and updated where appropriate. Prior to Admission medications    Medication Sig Start Date End Date Taking? Authorizing Provider   rizatriptan (MAXALT) 10 mg tablet TAKE 1 TAB ONCE AS NEEDED FOR MIGRAINE. MAY REPEAT IN 2 HOURS IF NEEDED MAX 2 TABS IN 24 HOURS 7/18/19  Yes Kirk Lion MD   cyclobenzaprine (FLEXERIL) 10 mg tablet Take 10 mg by mouth as needed. 6/10/19  Yes Provider, Historical   lamoTRIgine (LAMICTAL) 25 mg tablet Take 100 mg by mouth daily. 6/26/19  Yes Provider, Historical   fluticasone propionate (FLONASE) 50 mcg/actuation nasal spray 2 Sprays by Both Nostrils route daily. 4/21/19  Yes Provider, Historical   diclofenac EC (VOLTAREN) 75 mg EC tablet Take 75 mg by mouth as needed.  6/24/19  Yes Provider, Historical   levothyroxine (SYNTHROID) 50 mcg tablet TAKE ONE TABLET BY MOUTH ONCE DAILY ALONG WITH 200 MCG TABLET 6/10/19  Yes Mohan Chavez MD   levothyroxine (SYNTHROID) 200 mcg tablet TAKE 1 TABLET BY MOUTH ONCE DAILY ALONG WITH 50 MCG LEVOTHYROXINE TABLET 6/10/19  Yes Luis Alfredo Chavez MD   montelukast (SINGULAIR) 10 mg tablet TAKE ONE TABLET BY MOUTH DAILY 5/2/19  Yes Bernadine Rodriguez MD   cyanocobalamin (VITAMIN B-12) 1,000 mcg tablet Take 2 tabs daily for 2 weeks then take 1 tab daily  Patient taking differently: Take 1,000 mcg by mouth daily. Take 2 tabs daily for 2 weeks then take 1 tab daily 3/18/19  Yes Bernadine Rodriguez MD   riboflavin, vitamin B2, 400 mg tab Take 400 mg by mouth daily. 3/15/19  Yes Bernadine Rodriguez MD   DULoxetine (CYMBALTA) 60 mg capsule TAKE ONE CAPSULE BY MOUTH DAILY 5/4/18  Yes Bernadine Rodriguez MD   NUVARING 0.12-0.015 mg/24 hr vaginal ring INSERT VAGINALY X 3 WEEKS, REMOVE FOR 1 WEEK, THEN REPEAT 6/8/17  Yes Provider, Historical   cholecalciferol, vitamin D3, (VITAMIN D3) 2,000 unit tab Take 2,000 Units by mouth daily. Yes Provider, Historical   FERROUS FUMARATE (IRON PO) Take  by mouth daily. Yes Provider, Historical   EPIPEN 2-JOSE 0.3 mg/0.3 mL injection  5/6/16  Yes Provider, Historical   fexofenadine (ALLEGRA) 180 mg tablet Take 180 mg by mouth daily as needed. Yes Provider, Historical   buPROPion SR (WELLBUTRIN SR) 100 mg SR tablet Take 100 mg by mouth daily.     Provider, Historical       Allergies   Allergen Reactions    Hazelnut Anaphylaxis    Adhesive Tape-Silicones Itching     Paper tape    [place]on Swelling    Grass Pollen Unknown (comments)     + testing         OBJECTIVE    Visit Vitals  /80 (BP 1 Location: Left arm, BP Patient Position: Sitting)   Pulse 83   Temp 98.7 °F (37.1 °C) (Oral)   Resp 18   Ht 5' 7.5\" (1.715 m)   Wt 328 lb 12.8 oz (149.1 kg)   LMP 07/31/2019 (Exact Date)   SpO2 98%   BMI 50.74 kg/m²       Physical Exam   Constitutional: She is oriented to person, place, and time and well-developed, well-nourished, and in no distress. No distress. Eyes: Pupils are equal, round, and reactive to light. Conjunctivae and EOM are normal.   Musculoskeletal: Normal range of motion. Neurological: She is alert and oriented to person, place, and time. Gait normal.   Skin: Skin is warm and dry. She is not diaphoretic. Psychiatric: Mood, memory, affect and judgment normal.   Nursing note and vitals reviewed. ASSESSMENT AND PLAN  Mackenzie John is a 19 y.o. female who presents today for:    1. Sleep disorder  Unclear etiology. Recommend patient to call her psychiatrist to see if these are possibly medication side effect as well as her neurologist to consider EEG to rule out seizures. Will also refer her to sleep medicine to rule out sleep disorder. Advise to set up video recording of these episodes to show her other doctors. - REFERRAL TO SLEEP STUDIES    2. B12 deficiency  - VITAMIN B12 & FOLATE    3. Acquired hypothyroidism  - TSH AND FREE T4       Medications Discontinued During This Encounter   Medication Reason    buPROPion SR (WELLBUTRIN SR) 100 mg SR tablet            Medication risks/benefits/costs/interactions/alternatives discussed with patient. Advised patient to call back or return to office if symptoms worsen/change/persist. If patient cannot reach us or should anything more severe/urgent arise he/she should proceed directly to the nearest emergency department. Discussed expected course/resolution/complications of diagnosis in detail with patient. Patient given a written after visit summary which includes his/her diagnoses, current medications and vitals. Patient expressed understanding with the diagnosis and plan. Luis Alfredo Sesay M.D.

## 2019-08-06 NOTE — PATIENT INSTRUCTIONS
Hypothyroidism: Care Instructions  Your Care Instructions    You have hypothyroidism, which means that your body is not making enough thyroid hormone. This hormone helps your body use energy. If your thyroid level is low, you may feel tired, be constipated, have an increase in your blood pressure, or have dry skin or memory problems. You may also get cold easily, even when it is warm. Women with low thyroid levels may have heavy menstrual periods. A blood test to find your thyroid-stimulating hormone (TSH) level is used to check for hypothyroidism. A high TSH level may mean that you have low thyroid. When your body is not making enough thyroid hormone, TSH levels rise in an effort to make the body produce more. The treatment for hypothyroidism is to take thyroid hormone pills. You should start to feel better in 1 to 2 weeks. But it can take several months to see changes in the TSH level. You will need regular visits with your doctor to make sure you have the right dose of medicine. Most people need treatment for the rest of their lives. You will need to see your doctor regularly to have blood tests and to make sure you are doing well. Follow-up care is a key part of your treatment and safety. Be sure to make and go to all appointments, and call your doctor if you are having problems. It's also a good idea to know your test results and keep a list of the medicines you take. How can you care for yourself at home? · Take your thyroid hormone medicine exactly as prescribed. Call your doctor if you think you are having a problem with your medicine. Most people do not have side effects if they take the right amount of medicine regularly. ? Take the medicine 30 minutes before breakfast, and do not take it with calcium, vitamins, or iron. ? Do not take extra doses of your thyroid medicine. It will not help you get better any faster, and it may cause side effects.   ? If you forget to take a dose, do NOT take a double dose of medicine. Take your usual dose the next day. · Tell your doctor about all prescription, herbal, or over-the-counter products you take. · Take care of yourself. Eat a healthy diet, get enough sleep, and get regular exercise. When should you call for help? Call 911 anytime you think you may need emergency care. For example, call if:    · You passed out (lost consciousness).     · You have severe trouble breathing.     · You have a very slow heartbeat (less than 60 beats a minute).     · You have a low body temperature (95°F or below).    Call your doctor now or seek immediate medical care if:    · You feel tired, sluggish, or weak.     · You have trouble remembering things or concentrating.     · You do not begin to feel better 2 weeks after starting your medicine.    Watch closely for changes in your health, and be sure to contact your doctor if you have any problems. Where can you learn more? Go to http://angelito-brandon.info/. Enter K688 in the search box to learn more about \"Hypothyroidism: Care Instructions. \"  Current as of: November 6, 2018  Content Version: 12.1  © 4287-0910 [employer]. Care instructions adapted under license by [employer] (which disclaims liability or warranty for this information). If you have questions about a medical condition or this instruction, always ask your healthcare professional. [address] any warranty or liability for your use of this information.

## 2019-08-07 DIAGNOSIS — E03.9 ACQUIRED HYPOTHYROIDISM: ICD-10-CM

## 2019-08-07 LAB
FOLATE SERPL-MCNC: 4 NG/ML
T4 FREE SERPL-MCNC: 1.29 NG/DL (ref 0.82–1.77)
TSH SERPL DL<=0.005 MIU/L-ACNC: 21.39 UIU/ML (ref 0.45–4.5)
VIT B12 SERPL-MCNC: 426 PG/ML (ref 232–1245)

## 2019-08-07 RX ORDER — LEVOTHYROXINE SODIUM 200 UG/1
TABLET ORAL
Qty: 90 TAB | Refills: 1 | Status: SHIPPED | OUTPATIENT
Start: 2019-08-07 | End: 2020-08-11 | Stop reason: SDUPTHER

## 2019-08-07 RX ORDER — LEVOTHYROXINE SODIUM 75 UG/1
TABLET ORAL
Qty: 90 TAB | Refills: 1 | Status: SHIPPED | OUTPATIENT
Start: 2019-08-07 | End: 2020-08-11 | Stop reason: SDUPTHER

## 2019-10-07 ENCOUNTER — HOSPITAL ENCOUNTER (EMERGENCY)
Age: 19
Discharge: LEFT BEFORE DISCHARGE | End: 2019-10-07
Attending: EMERGENCY MEDICINE | Admitting: EMERGENCY MEDICINE
Payer: MEDICAID

## 2019-10-07 VITALS — OXYGEN SATURATION: 98 %

## 2019-10-07 DIAGNOSIS — Z53.21 ELOPED FROM EMERGENCY DEPARTMENT: Primary | ICD-10-CM

## 2019-10-07 PROCEDURE — 75810000275 HC EMERGENCY DEPT VISIT NO LEVEL OF CARE

## 2019-10-08 NOTE — ED PROVIDER NOTES
10:08 PM    I was inadvertently assigned to this patient's treatment team.  I did not see this patient nor did I have any contact with this patient. I had no involvement during the evaluation, treatment or disposition of this patient. I am signing off this note to indicate only why my name appeared in the record. Patient left after quick look nurse evaluation.   Luda dEwards PA-C             Past Medical History:   Diagnosis Date    Adverse effect of anesthesia     REQUIRES A LOT OF ANESTHESIA PER MOTHER    Anemia     Anxiety     Attention deficit disorder (ADD)     Closed fracture of metatarsal neck 11/11/2015    [facility], 9/15/15 with Dr. Dennise Rodriguez alignment and healing     Conversion disorder     Depression     Hx of seasonal allergies     Hypothyroidism 1/8/2014    Kyphosis     Kyphosis     Obesity     Scoliosis     Scoliosis     Snoring     Vision decreased     Vitamin D deficiency 1/8/2014    Took 12 weeks of weekly 58597 international units of vitamin d and now on 2000 international units  daily          Past Surgical History:   Procedure Laterality Date    HX BACK SURGERY  2009    Thoracic cage with screws    HX KNEE ARTHROSCOPY  01/2017    LEFT KNEE         Family History:   Problem Relation Age of Onset    Heart Failure Maternal Grandmother     Hypertension Maternal Grandmother     Asthma Maternal Grandmother     Cancer Paternal Grandmother         lung    Arthritis-osteo Mother         psoriatic    Thyroid Disease Mother         Graves    Diabetes Maternal Grandfather     Heart Disease Maternal Grandfather     Diabetes Paternal Grandfather     No Known Problems Father     No Known Problems Sister     No Known Problems Brother     No Known Problems Maternal Aunt     No Known Problems Maternal Uncle     No Known Problems Paternal Aunt     No Known Problems Paternal Uncle     No Known Problems Other        Social History     Socioeconomic History    Marital status: SINGLE     Spouse name: Not on file    Number of children: Not on file    Years of education: Not on file    Highest education level: Not on file   Occupational History    Not on file   Social Needs    Financial resource strain: Not on file    Food insecurity:     Worry: Not on file     Inability: Not on file    Transportation needs:     Medical: Not on file     Non-medical: Not on file   Tobacco Use    Smoking status: Never Smoker    Smokeless tobacco: Never Used   Substance and Sexual Activity    Alcohol use: No    Drug use: No    Sexual activity: Yes     Partners: Male     Birth control/protection: Inserts   Lifestyle    Physical activity:     Days per week: Not on file     Minutes per session: Not on file    Stress: Not on file   Relationships    Social connections:     Talks on phone: Not on file     Gets together: Not on file     Attends Jewish service: Not on file     Active member of club or organization: Not on file     Attends meetings of clubs or organizations: Not on file     Relationship status: Not on file    Intimate partner violence:     Fear of current or ex partner: Not on file     Emotionally abused: Not on file     Physically abused: Not on file     Forced sexual activity: Not on file   Other Topics Concern    Not on file   Social History Narrative    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

## 2019-10-29 DIAGNOSIS — Z88.9 HX OF SEASONAL ALLERGIES: ICD-10-CM

## 2019-10-29 RX ORDER — MONTELUKAST SODIUM 10 MG/1
TABLET ORAL
Qty: 90 TAB | Refills: 1 | Status: SHIPPED | OUTPATIENT
Start: 2019-10-29 | End: 2022-04-01

## 2019-10-29 NOTE — TELEPHONE ENCOUNTER
Called patient to remind her that she needs to come in for blood work, thyroid check. Left a voicemail.

## 2020-04-10 ENCOUNTER — OFFICE VISIT (OUTPATIENT)
Dept: PRIMARY CARE CLINIC | Age: 20
End: 2020-04-10

## 2020-04-10 ENCOUNTER — VIRTUAL VISIT (OUTPATIENT)
Dept: FAMILY MEDICINE CLINIC | Age: 20
End: 2020-04-10

## 2020-04-10 VITALS
BODY MASS INDEX: 44.41 KG/M2 | RESPIRATION RATE: 18 BRPM | WEIGHT: 293 LBS | TEMPERATURE: 99.2 F | HEART RATE: 77 BPM | OXYGEN SATURATION: 98 % | HEIGHT: 68 IN

## 2020-04-10 VITALS — TEMPERATURE: 97.7 F

## 2020-04-10 DIAGNOSIS — J40 BRONCHITIS: Primary | ICD-10-CM

## 2020-04-10 DIAGNOSIS — R05.9 COUGH: ICD-10-CM

## 2020-04-10 RX ORDER — AZITHROMYCIN 250 MG/1
TABLET, FILM COATED ORAL
Qty: 6 TAB | Refills: 0 | Status: SHIPPED | OUTPATIENT
Start: 2020-04-10 | End: 2020-04-15

## 2020-04-10 NOTE — PATIENT INSTRUCTIONS

## 2020-04-10 NOTE — PROGRESS NOTES
Patient with symptoms of fever and cough. Advised patient to go to [facility]) today for further evaluation. No LOS for current encounter.

## 2020-04-10 NOTE — PROGRESS NOTES
Chief Complaint   Patient presents with    Cough     pt states she is coughing clear sputum and her chest feels congested. she thinks she is getting bronchitis. temp 99 yesterday. does not feel she has a fever today.  Medication Evaluation     pt also reports she has not had insurance and is not any of her medications at this time     [address] DOCUMENTATION\"  1. Have you been to the ER, urgent care clinic since your last visit? Hospitalized since your last visit? No    2. Have you seen or consulted any other health care providers outside of the [address] [place] [address] since your last visit? Include any pap smears or colon screening.  No

## 2020-04-10 NOTE — PROGRESS NOTES
Chief Complaint   Patient presents with    Cold Symptoms     Pulse 77   Temp 99.2 °F (37.3 °C)   Resp 18   Ht 5' 7.5\" (1.715 m)   Wt 328 lb (148.8 kg)   LMP 03/19/2020   SpO2 98%   BMI 50.61 kg/m²         SUBJECTIVE:   Alayna Trejo is a 22 y.o. female who complains of coryza, congestion, nasal blockage, productive cough, cough described as productive of green sputum, myalgias, low grade fevers and green nasal discharge for 5 days. She denies a history of chills, dizziness, fatigue, shortness of breath, sweats, vomiting, weakness and wheezing and does not a history of asthma. Patient does not smoke cigarettes. OBJECTIVE:  She appears well, vital signs are as noted. Ears normal.  Throat and pharynx normal.  Neck supple. No adenopathy in the neck. Nose is congested. Sinuses non tender. The chest is clear, without wheezes or rales. Physical Exam  Vitals signs and nursing note reviewed. Constitutional:       General: She is not in acute distress. Appearance: She is well-developed. She is not diaphoretic. HENT:      Head: Normocephalic and atraumatic. Neck:      Vascular: No carotid bruit. Cardiovascular:      Rate and Rhythm: Normal rate and regular rhythm. Heart sounds: Normal heart sounds. No murmur. No friction rub. No gallop. Pulmonary:      Effort: Pulmonary effort is normal. No respiratory distress. Breath sounds: Normal breath sounds. No wheezing or rales. Neurological:      Mental Status: She is alert and oriented to person, place, and time. Psychiatric:         Behavior: Behavior normal.         Thought Content: Thought content normal.         Judgment: Judgment normal.       Diagnoses and all orders for this visit:    1. Bronchitis  -     azithromycin (ZITHROMAX) 250 mg tablet; Take 2 tablets today, then take 1 tablet daily    2. Cough        -      Advised to take mucinex 1 tab PO BID x 7 days.     Medication risks/benefits/costs/interactions/alternatives discussed with patient. Advised patient to call back or return to office if symptoms worsen/change/persist.  If patient cannot reach us or should anything more severe/urgent arise he/she should proceed directly to the nearest emergency department. Discussed expected course/resolution/complications of diagnosis in detail with patient. Patient given a written after visit summary which includes her diagnoses, current medications and vitals. Patient expressed understanding with the diagnosis and plan.

## 2020-07-29 LAB — SARS-COV-2, NAA: DETECTED

## 2020-08-10 ENCOUNTER — TELEPHONE (OUTPATIENT)
Dept: FAMILY MEDICINE CLINIC | Age: 20
End: 2020-08-10

## 2020-08-10 NOTE — TELEPHONE ENCOUNTER
----- Message from Dameon Ohs sent at 8/7/2020  8:32 AM EDT -----  Regarding: Dr. Raciel Lenz / telephone  Contact: [phone]  Caller's first and last name: n/a  Reason for call: Pt. would like to know the next steps in her COVID-19 recovery.   Callback required yes/no and why: yes   Best contact number(s): [phone]  Details to clarify the request: n/a

## 2020-08-11 ENCOUNTER — VIRTUAL VISIT (OUTPATIENT)
Dept: FAMILY MEDICINE CLINIC | Age: 20
End: 2020-08-11
Payer: MEDICAID

## 2020-08-11 DIAGNOSIS — U07.1 COVID-19: Primary | ICD-10-CM

## 2020-08-11 DIAGNOSIS — E03.9 ACQUIRED HYPOTHYROIDISM: ICD-10-CM

## 2020-08-11 DIAGNOSIS — F41.9 ANXIETY: ICD-10-CM

## 2020-08-11 PROCEDURE — 99214 OFFICE O/P EST MOD 30 MIN: CPT | Performed by: FAMILY MEDICINE

## 2020-08-11 RX ORDER — LEVOTHYROXINE SODIUM 75 UG/1
TABLET ORAL
Qty: 90 TAB | Refills: 0 | Status: SHIPPED | OUTPATIENT
Start: 2020-08-11 | End: 2020-12-14

## 2020-08-11 RX ORDER — DULOXETIN HYDROCHLORIDE 60 MG/1
CAPSULE, DELAYED RELEASE ORAL
Qty: 90 CAP | Refills: 1 | Status: SHIPPED | OUTPATIENT
Start: 2020-08-11 | End: 2022-04-01

## 2020-08-11 RX ORDER — LEVOTHYROXINE SODIUM 200 UG/1
TABLET ORAL
Qty: 90 TAB | Refills: 0 | Status: SHIPPED | OUTPATIENT
Start: 2020-08-11 | End: 2022-04-01 | Stop reason: SDUPTHER

## 2020-08-11 NOTE — PROGRESS NOTES
Tim Russo, who was evaluated through a synchronous (real-time) audio-video encounter, and/or her healthcare decision maker, is aware that it is a billable service, with coverage as determined by her insurance carrier. She provided verbal consent to proceed: YES, and patient identification was verified. It was conducted pursuant to the emergency declaration under the [facility], [facility] authority and the [facility] and [employer] General Act. A caregiver was present when appropriate. Ability to conduct physical exam was limited. I was at home. The patient was at home. This virtual visit was conducted via [employer]. Pursuant to the emergency declaration under the [address] [place] [address], [address] [place] [address] authority and the [employer] Act, this Virtual  Visit was conducted to reduce the patient's risk of exposure to COVID-19 and provide continuity of care for an established patient. Services were provided through a video synchronous discussion virtually to substitute for in-person clinic visit. Due to this being a TeleHealth evaluation, many elements of the physical examination are unable to be assessed. Total Time: minutes: 11-20 minutes. Prerna Rae MD    [id number]  Subjective:   Tim Russo was seen for Concern For COVID-19 (Coronavirus)    Pt was diagnosed with COVID after URI symptoms a few weeks ago. States that her whole household got it. Currently has been symptom free x 2 days. Works as a manager at [employer]. Is wondering when she can return to work. Has been off of her medications for several months as she lost them during a move. She would like to restart her medications for anxiety and hypothyroidism. Prior to Admission medications    Medication Sig Start Date End Date Taking?  Authorizing Provider   levothyroxine (SYNTHROID) 75 mcg tablet TAKE ONE 75 MCG TABLET BY MOUTH ONCE DAILY ALONG WITH 200 MCG TABLET 8/11/20  Yes Vladimir Francisco MD   levothyroxine (SYNTHROID) 200 mcg tablet TAKE  MCG TABLET BY MOUTH ONCE DAILY ALONG WITH 75 MCG LEVOTHYROXINE TABLET 8/11/20  Yes Vladimir Francisco MD   DULoxetine (CYMBALTA) 60 mg capsule TAKE ONE CAPSULE BY MOUTH DAILY 8/11/20  Yes Vladimir Francisco MD   montelukast (SINGULAIR) 10 mg tablet TAKE 1 TABLET BY MOUTH EVERY DAY 10/29/19   Vladimir Francisco MD   levothyroxine (SYNTHROID) 200 mcg tablet TAKE  MCG TABLET BY MOUTH ONCE DAILY ALONG WITH 75 MCG LEVOTHYROXINE TABLET 8/7/19 8/11/20  Vladimir Francisco MD   levothyroxine (SYNTHROID) 75 mcg tablet TAKE ONE 75 MCG TABLET BY MOUTH ONCE DAILY ALONG WITH 200 MCG TABLET 8/7/19 8/11/20  Vladimir Francisco MD   rizatriptan (MAXALT) 10 mg tablet TAKE 1 TAB ONCE AS NEEDED FOR MIGRAINE. MAY REPEAT IN 2 HOURS IF NEEDED MAX 2 TABS IN 24 HOURS 7/18/19   Vladimir Francisco MD   cyclobenzaprine (FLEXERIL) 10 mg tablet Take 10 mg by mouth as needed. 6/10/19   Provider, Historical   fluticasone propionate (FLONASE) 50 mcg/actuation nasal spray 2 Sprays by Both Nostrils route daily. 4/21/19   Provider, Historical   diclofenac EC (VOLTAREN) 75 mg EC tablet Take 75 mg by mouth as needed. 6/24/19   Provider, Historical   lamoTRIgine (LAMICTAL) 25 mg tablet Take 100 mg by mouth daily. 6/26/19 8/11/20  Provider, Historical   cyanocobalamin (VITAMIN B-12) 1,000 mcg tablet Take 2 tabs daily for 2 weeks then take 1 tab daily  Patient taking differently: Take 1,000 mcg by mouth daily. Take 2 tabs daily for 2 weeks then take 1 tab daily 3/18/19   Vladimir Francisco MD   riboflavin, vitamin B2, 400 mg tab Take 400 mg by mouth daily.  3/15/19   Vladimir Francisco MD   DULoxetine (CYMBALTA) 60 mg capsule TAKE ONE CAPSULE BY MOUTH DAILY 5/4/18 8/11/20  Phyliss Francisco, MD   NUVARING 0.12-0.015 mg/24 hr vaginal ring INSERT VAGINALY X 3 WEEKS, REMOVE FOR 1 WEEK, THEN REPEAT 6/8/17   Provider, Historical   cholecalciferol, vitamin D3, (VITAMIN D3) 2,000 unit tab Take 2,000 Units by mouth daily. Provider, Historical   FERROUS FUMARATE (IRON PO) Take  by mouth daily. Provider, Historical   EPIPEN 2-JOSE 0.3 mg/0.3 mL injection  5/6/16   Provider, Historical   fexofenadine (ALLEGRA) 180 mg tablet Take 180 mg by mouth daily as needed. Provider, Historical       Allergies   Allergen Reactions    Hazelnut Anaphylaxis    Adhesive Tape-Silicones Itching     Paper tape    [place] Swelling    Grass Pollen Unknown (comments)     + testing       Review of Systems   Constitutional: Negative for fever, malaise/fatigue and weight loss. Respiratory: Negative for cough, hemoptysis, shortness of breath and wheezing. Cardiovascular: Negative for chest pain, palpitations, leg swelling and PND. Gastrointestinal: Negative for abdominal pain, constipation, diarrhea, nausea and vomiting. Physical Exam:     There were no vitals taken for this visit. General: alert, cooperative, no distress   Mental  status: normal mood, behavior, speech, dress, motor activity, and thought processes, able to follow commands   HENT: NCAT   Neck: no visualized mass   Resp: no respiratory distress   Neuro: no gross deficits   Skin: no discoloration or lesions of concern on visible areas   Psychiatric: normal affect, consistent with stated mood, no evidence of hallucinations       Assessment & Plan:     Eduardo Gordon is a 23 y.o. female who presents today for:    1. COVID-19  Letter written to allow pt to return to work after 20 days since symptoms onset. She is currently asymptomatic x 2 days. Will mail to her home address. 2. Acquired hypothyroidism  Restart levothyroxine and pt to obtain labs in one month. - levothyroxine (SYNTHROID) 75 mcg tablet; TAKE ONE 75 MCG TABLET BY MOUTH ONCE DAILY ALONG WITH 200 MCG TABLET  Dispense: 90 Tab;  Refill: 0  - levothyroxine (SYNTHROID) 200 mcg tablet; TAKE  MCG TABLET BY MOUTH ONCE DAILY ALONG WITH 75 MCG LEVOTHYROXINE TABLET  Dispense: 90 Tab; Refill: 0  - TSH REFLEX TO T4    3. Anxiety  - DULoxetine (CYMBALTA) 60 mg capsule; TAKE ONE CAPSULE BY MOUTH DAILY  Dispense: 90 Cap; Refill: 1       Medications Discontinued During This Encounter   Medication Reason    levothyroxine (SYNTHROID) 75 mcg tablet Reorder    levothyroxine (SYNTHROID) 200 mcg tablet Reorder    DULoxetine (CYMBALTA) 60 mg capsule Reorder    lamoTRIgine (LAMICTAL) 25 mg tablet Not A Current Medication       Follow-up and Dispositions    · Return in about 4 weeks (around 9/8/2020) for Medication Check. Treatment risks/benefits/costs/interactions/alternatives discussed with patient. Advised patient to call back or return to office if symptoms worsen/change/persist. If patient cannot reach us or should anything more severe/urgent arise he/she should proceed directly to the nearest emergency department. Discussed expected course/resolution/complications of diagnosis in detail with patient. Patient expressed understanding with the diagnosis and plan. Luis Alfredo Szymanski M.D.

## 2020-08-11 NOTE — LETTER
NOTIFICATION RETURN TO WORK / SCHOOL 
 
8/11/2020 4:14 PM 
 
Ms. Severiano Dopp [address]  [id number] To Whom It May Concern: 
 
Severiano Dopp is currently under the care of RONDA Boone. She will return to work/school on: August 17th 2020 If there are questions or concerns please have the patient contact our office. Sincerely, Adán Draper MD

## 2021-11-24 ENCOUNTER — TELEPHONE (OUTPATIENT)
Dept: FAMILY MEDICINE CLINIC | Age: 21
End: 2021-11-24

## 2021-11-24 NOTE — TELEPHONE ENCOUNTER
----- Message from Sujata Castillo sent at 11/24/2021 12:23 PM EST -----  Subject: Appointment Request    Reason for Call: Routine Existing Condition Follow Up    QUESTIONS  Type of Appointment? Established Patient  Reason for appointment request? Available appointments did not meet   patient need  Additional Information for Provider? Patient called because she wanted to   talk to her pcp  about getting back on her mood medication. She has not taken them in a while but wants to start taking them gain or   see what the doctor recommends. At the time of the call the earliest appt   available was on april and that is too far away please call patient to   advise   ---------------------------------------------------------------------------  --------------  [address]  What is the best way for the office to contact you? OK to leave message on   voicemail  Preferred Call Back Phone Number? [phone]  ---------------------------------------------------------------------------  --------------  SCRIPT ANSWERS  Relationship to Patient? Self  (Is the patient requesting to be seen urgently for their symptoms?)? No  Is this follow up request related to routine Diabetes Management? No  Are you having any new concerns about your existing condition? No  Have you been diagnosed with, awaiting test results for, or told that you   are suspected of having COVID-19 (Coronavirus)? (If patient has tested   negative or was tested as a requirement for work, school, or travel and   not based on symptoms, answer no)? No  Within the past two weeks have you developed any of the following symptoms   (answer no if symptoms have been present longer than 2 weeks or began   more than 2 weeks ago)?  Fever or Chills, Cough, Shortness of breath or   difficulty breathing, Loss of taste or smell, Sore throat, Nasal   congestion, Sneezing or runny nose, Fatigue or generalized body aches   (answer no if pain is specific to a body part e.g. back pain), Diarrhea,   Headache? No  Have you had close contact with someone with COVID-19 in the last 14 days? No  (Service Expert  click yes below to proceed with 27 bards As Usual   Scheduling)?  Yes

## 2022-03-16 ENCOUNTER — HOSPITAL ENCOUNTER (EMERGENCY)
Age: 22
Discharge: HOME | End: 2022-03-17
Attending: EMERGENCY MEDICINE | Admitting: EMERGENCY MEDICINE
Payer: COMMERCIAL

## 2022-03-16 ENCOUNTER — APPOINTMENT (OUTPATIENT)
Dept: CT IMAGING | Age: 22
End: 2022-03-16
Attending: PHYSICIAN ASSISTANT
Payer: COMMERCIAL

## 2022-03-16 VITALS
TEMPERATURE: 97.6 F | HEIGHT: 67 IN | SYSTOLIC BLOOD PRESSURE: 139 MMHG | DIASTOLIC BLOOD PRESSURE: 94 MMHG | BODY MASS INDEX: 51.37 KG/M2 | HEART RATE: 81 BPM | RESPIRATION RATE: 18 BRPM | OXYGEN SATURATION: 98 %

## 2022-03-16 DIAGNOSIS — D50.9 IRON DEFICIENCY ANEMIA, UNSPECIFIED IRON DEFICIENCY ANEMIA TYPE: ICD-10-CM

## 2022-03-16 DIAGNOSIS — R55 NEAR SYNCOPE: Primary | ICD-10-CM

## 2022-03-16 LAB
ALBUMIN SERPL-MCNC: 4 G/DL (ref 3.5–5)
ALBUMIN/GLOB SERPL: 1.1 {RATIO} (ref 1.1–2.2)
ALP SERPL-CCNC: 80 U/L (ref 45–117)
ALT SERPL-CCNC: 16 U/L (ref 12–78)
ANION GAP SERPL CALC-SCNC: 5 MMOL/L (ref 5–15)
AST SERPL-CCNC: 11 U/L (ref 15–37)
BASOPHILS # BLD: 0 K/UL (ref 0–0.1)
BASOPHILS NFR BLD: 1 % (ref 0–1)
BILIRUB SERPL-MCNC: 0.5 MG/DL (ref 0.2–1)
BUN SERPL-MCNC: 18 MG/DL (ref 6–20)
BUN/CREAT SERPL: 25 (ref 12–20)
CALCIUM SERPL-MCNC: 8.6 MG/DL (ref 8.5–10.1)
CHLORIDE SERPL-SCNC: 104 MMOL/L (ref 97–108)
CO2 SERPL-SCNC: 27 MMOL/L (ref 21–32)
COMMENT, HOLDF: NORMAL
CREAT SERPL-MCNC: 0.72 MG/DL (ref 0.55–1.02)
DIFFERENTIAL METHOD BLD: ABNORMAL
EOSINOPHIL # BLD: 0.1 K/UL (ref 0–0.4)
EOSINOPHIL NFR BLD: 2 % (ref 0–7)
ERYTHROCYTE [DISTWIDTH] IN BLOOD BY AUTOMATED COUNT: 14.6 % (ref 11.5–14.5)
GLOBULIN SER CALC-MCNC: 3.5 G/DL (ref 2–4)
GLUCOSE SERPL-MCNC: 95 MG/DL (ref 65–100)
HCG UR QL: NEGATIVE
HCG UR QL: NEGATIVE
HCT VFR BLD AUTO: 30.2 % (ref 35–47)
HGB BLD-MCNC: 9.3 G/DL (ref 11.5–16)
IMM GRANULOCYTES # BLD AUTO: 0 K/UL (ref 0–0.04)
IMM GRANULOCYTES NFR BLD AUTO: 0 % (ref 0–0.5)
LYMPHOCYTES # BLD: 1.2 K/UL (ref 0.8–3.5)
LYMPHOCYTES NFR BLD: 19 % (ref 12–49)
MCH RBC QN AUTO: 25.3 PG (ref 26–34)
MCHC RBC AUTO-ENTMCNC: 30.8 G/DL (ref 30–36.5)
MCV RBC AUTO: 82.1 FL (ref 80–99)
MONOCYTES # BLD: 0.4 K/UL (ref 0–1)
MONOCYTES NFR BLD: 6 % (ref 5–13)
NEUTS SEG # BLD: 4.7 K/UL (ref 1.8–8)
NEUTS SEG NFR BLD: 72 % (ref 32–75)
NRBC # BLD: 0 K/UL (ref 0–0.01)
NRBC BLD-RTO: 0 PER 100 WBC
PLATELET # BLD AUTO: 213 K/UL (ref 150–400)
PMV BLD AUTO: 10 FL (ref 8.9–12.9)
POTASSIUM SERPL-SCNC: 3.5 MMOL/L (ref 3.5–5.1)
PROT SERPL-MCNC: 7.5 G/DL (ref 6.4–8.2)
RBC # BLD AUTO: 3.68 M/UL (ref 3.8–5.2)
SAMPLES BEING HELD,HOLD: NORMAL
SODIUM SERPL-SCNC: 136 MMOL/L (ref 136–145)
WBC # BLD AUTO: 6.4 K/UL (ref 3.6–11)

## 2022-03-16 PROCEDURE — 36415 COLL VENOUS BLD VENIPUNCTURE: CPT

## 2022-03-16 PROCEDURE — 70450 CT HEAD/BRAIN W/O DYE: CPT

## 2022-03-16 PROCEDURE — 99284 EMERGENCY DEPT VISIT MOD MDM: CPT

## 2022-03-16 PROCEDURE — 84484 ASSAY OF TROPONIN QUANT: CPT

## 2022-03-16 PROCEDURE — 81025 URINE PREGNANCY TEST: CPT

## 2022-03-16 PROCEDURE — 85025 COMPLETE CBC W/AUTO DIFF WBC: CPT

## 2022-03-16 PROCEDURE — 80053 COMPREHEN METABOLIC PANEL: CPT

## 2022-03-16 PROCEDURE — 93005 ELECTROCARDIOGRAM TRACING: CPT

## 2022-03-16 PROCEDURE — 74011250637 HC RX REV CODE- 250/637: Performed by: PHYSICIAN ASSISTANT

## 2022-03-16 RX ORDER — IBUPROFEN 600 MG/1
600 TABLET ORAL
Status: COMPLETED | OUTPATIENT
Start: 2022-03-16 | End: 2022-03-16

## 2022-03-16 RX ADMIN — IBUPROFEN 600 MG: 600 TABLET, FILM COATED ORAL at 23:02

## 2022-03-17 LAB
ATRIAL RATE: 77 BPM
CALCULATED P AXIS, ECG09: 13 DEGREES
CALCULATED R AXIS, ECG10: 12 DEGREES
CALCULATED T AXIS, ECG11: 24 DEGREES
DIAGNOSIS, 93000: NORMAL
P-R INTERVAL, ECG05: 152 MS
Q-T INTERVAL, ECG07: 404 MS
QRS DURATION, ECG06: 82 MS
QTC CALCULATION (BEZET), ECG08: 457 MS
TROPONIN-HIGH SENSITIVITY: 4 NG/L (ref 0–51)
VENTRICULAR RATE, ECG03: 77 BPM

## 2022-03-17 NOTE — ED PROVIDER NOTES
19year old F presenting to the ED for multiple complaints. Pt notes that she was driving her friend home, had a metallic taste in her mouth, \"my body went numb for just a second, my body ran cold. \"  Tingling, L=R, upper and lower. Felt lightheaded. Happened at about 8:30, symptoms have markedly improved but not resolved. + headache. No vision changes. Denies  Nausea, vomiting, CP, SOB, palpitations. Patient denies hx VTE, recent immobilization, exogenous estrogen use, hemoptysis, unilateral leg pain/swelling. No hx similar symptoms. Social: denies tobacco, alcohol, or drug use. School photography.     Allergies UTD           Past Medical History:   Diagnosis Date    Adverse effect of anesthesia     REQUIRES A LOT OF ANESTHESIA PER MOTHER    Anemia     Anxiety     Attention deficit disorder (ADD)     Closed fracture of metatarsal neck 11/11/2015    [facility], 9/15/15 with Dr. Darek Garcia alignment and healing     Conversion disorder     Depression     Hx of seasonal allergies     Hypothyroidism 1/8/2014    Kyphosis     Kyphosis     Obesity     Scoliosis     Scoliosis     Snoring     Vision decreased     Vitamin D deficiency 1/8/2014    Took 12 weeks of weekly 26691 international units of vitamin d and now on 2000 international units  daily          Past Surgical History:   Procedure Laterality Date    HX BACK SURGERY  2009    Thoracic cage with screws    HX KNEE ARTHROSCOPY  01/2017    LEFT KNEE         Family History:   Problem Relation Age of Onset    Heart Failure Maternal Grandmother     Hypertension Maternal Grandmother     Asthma Maternal Grandmother     Cancer Paternal Grandmother         lung    OSTEOARTHRITIS Mother         psoriatic    Thyroid Disease Mother         Graves    Diabetes Maternal Grandfather     Heart Disease Maternal Grandfather     Diabetes Paternal Grandfather     No Known Problems Father     No Known Problems Sister     No Known Problems Brother     No Known Problems Maternal Aunt     No Known Problems Maternal Uncle     No Known Problems Paternal Aunt     No Known Problems Paternal Uncle     No Known Problems Other        Social History     Socioeconomic History    Marital status: SINGLE     Spouse name: Not on file    Number of children: Not on file    Years of education: Not on file    Highest education level: Not on file   Occupational History    Not on file   Tobacco Use    Smoking status: Never Smoker    Smokeless tobacco: Never Used   Substance and Sexual Activity    Alcohol use: No    Drug use: No    Sexual activity: Yes     Partners: Male     Birth control/protection: Inserts   Other Topics Concern    Not on file   Social History Narrative    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    Has several cats    Lives with mom     Social Determinants of Health     Financial Resource Strain:     Difficulty of Paying Living Expenses: Not on file   Food Insecurity:     Worried About Running Out of Food in the Last Year: Not on file    Charline of Food in the Last Year: Not on file   Transportation Needs:     Lack of Transportation (Medical): Not on file    Lack of Transportation (Non-Medical):  Not on file   Physical Activity:     Days of Exercise per Week: Not on file    Minutes of Exercise per Session: Not on file   Stress:     Feeling of Stress : Not on file   Social Connections:     Frequency of Communication with Friends and Family: Not on file    Frequency of Social Gatherings with Friends and Family: Not on file    Attends Oriental orthodox Services: Not on file    Active Member of Clubs or Organizations: Not on file    Attends Club or Organization Meetings: Not on file    Marital Status: Not on file   Intimate Partner Violence:     Fear of Current or Ex-Partner: Not on file    Emotionally Abused: Not on file    Physically Abused: Not on file    Sexually Abused: Not on file   Housing Stability:     Unable to Pay for Housing in the Last Year: Not on file    Number of Places Lived in the Last Year: Not on file    Unstable Housing in the Last Year: Not on file         ALLERGIES: Hazelnut, Adhesive tape-silicones, [place], and Grass pollen    Review of Systems   Constitutional: Negative for fever. HENT: Negative for facial swelling. Eyes: Negative for visual disturbance. Respiratory: Negative for shortness of breath. Cardiovascular: Negative for chest pain. Gastrointestinal: Negative for vomiting. Musculoskeletal: Negative for neck stiffness. Skin: Negative for wound. Neurological: Positive for numbness and headaches. Negative for syncope. All other systems reviewed and are negative. Vitals:    03/16/22 2144   BP: (!) 139/94   Pulse: 81   Resp: 18   Temp: 97.6 °F (36.4 °C)   SpO2: 98%   Height: 5' 7\" (1.702 m)            Physical Exam  Vitals and nursing note reviewed. Constitutional:       General: She is not in acute distress. Appearance: She is well-developed. Comments: Pleasant, well-appearing, no distress   HENT:      Head: Normocephalic and atraumatic. Right Ear: External ear normal.      Left Ear: External ear normal.   Eyes:      General: No scleral icterus. Extraocular Movements: Extraocular movements intact. Conjunctiva/sclera: Conjunctivae normal.      Pupils: Pupils are equal, round, and reactive to light. Neck:      Trachea: No tracheal deviation. Cardiovascular:      Rate and Rhythm: Normal rate and regular rhythm. Heart sounds: Normal heart sounds. No murmur heard. No friction rub. No gallop. Pulmonary:      Effort: Pulmonary effort is normal. No respiratory distress. Breath sounds: Normal breath sounds. No stridor. No wheezing. Abdominal:      General: There is no distension. Palpations: Abdomen is soft. Musculoskeletal:         General: Normal range of motion. Cervical back: Neck supple. Skin:     General: Skin is warm and dry. Neurological:      General: No focal deficit present. Mental Status: She is alert and oriented to person, place, and time. Cranial Nerves: No cranial nerve deficit (2 through 12 tested and intact). Psychiatric:         Behavior: Behavior normal.          MDM  Number of Diagnoses or Management Options  Iron deficiency anemia, unspecified iron deficiency anemia type  Near syncope  Diagnosis management comments: 23-year-old female presenting to the ED for an episode of near syncope that occurred while driving. No chest pain, shortness of breath, palpitations. PERC negative. Had diffuse numbness, but no focal neurologic symptoms, no deficits noted on exam.  Work-up remarkable for anemia, hemoglobin 9.3, lower than baseline from a couple of years ago. Patient does admit to menorrhagia, it was previously recommended that she start on an iron supplement. Encouraged her to do so, follow-up with her primary care, return precautions given.        Amount and/or Complexity of Data Reviewed  Clinical lab tests: ordered and reviewed  Tests in the radiology section of CPT®: ordered and reviewed  Discuss the patient with other providers: yes (Dr. Juan Campos ED attending)           Procedures

## 2022-03-17 NOTE — ED TRIAGE NOTES
Pt has metallic taste in her mouth, states her body went numb, pt feels cold, and her extremities tingling (all started 2030). Pt states she felt like she almost loss consciousness. Pt has a headache all day. Pt denies blurred vision, and nausea.

## 2022-03-17 NOTE — DISCHARGE INSTRUCTIONS
Return for new or worsening symptoms. Your work-up tonight was overall reassuring. Your hemoglobin, or an indirect measurement of your iron count, was lower than it was several years ago. This could have contributed to your symptoms. Start taking the iron supplement as recommended. Make a follow-up appointment with your primary care provider.

## 2022-03-20 PROBLEM — E66.01 MORBID OBESITY WITH BMI OF 45.0-49.9, ADULT (HCC): Status: ACTIVE | Noted: 2017-11-29

## 2022-04-01 ENCOUNTER — VIRTUAL VISIT (OUTPATIENT)
Dept: FAMILY MEDICINE CLINIC | Age: 22
End: 2022-04-01
Payer: COMMERCIAL

## 2022-04-01 DIAGNOSIS — D50.9 IRON DEFICIENCY ANEMIA, UNSPECIFIED IRON DEFICIENCY ANEMIA TYPE: ICD-10-CM

## 2022-04-01 DIAGNOSIS — E03.9 ACQUIRED HYPOTHYROIDISM: Primary | ICD-10-CM

## 2022-04-01 DIAGNOSIS — F41.9 ANXIETY AND DEPRESSION: ICD-10-CM

## 2022-04-01 DIAGNOSIS — R06.83 SNORING: ICD-10-CM

## 2022-04-01 DIAGNOSIS — F32.A ANXIETY AND DEPRESSION: ICD-10-CM

## 2022-04-01 PROCEDURE — 99214 OFFICE O/P EST MOD 30 MIN: CPT | Performed by: FAMILY MEDICINE

## 2022-04-01 RX ORDER — DULOXETIN HYDROCHLORIDE 30 MG/1
30 CAPSULE, DELAYED RELEASE ORAL DAILY
COMMUNITY
End: 2022-04-01 | Stop reason: SDUPTHER

## 2022-04-01 RX ORDER — DULOXETIN HYDROCHLORIDE 60 MG/1
60 CAPSULE, DELAYED RELEASE ORAL DAILY
Qty: 90 CAPSULE | Refills: 1 | Status: SHIPPED | OUTPATIENT
Start: 2022-04-01

## 2022-04-01 RX ORDER — LEVOTHYROXINE SODIUM 200 UG/1
200 TABLET ORAL
Qty: 90 TABLET | Refills: 1 | Status: SHIPPED | OUTPATIENT
Start: 2022-04-01

## 2022-04-01 NOTE — PROGRESS NOTES
Parris Benjamin, was evaluated through a synchronous (real-time) audio-video encounter. The patient (or guardian if applicable) is aware that this is a billable service, which includes applicable co-pays. This Virtual Visit was conducted with patient's (and/or legal guardian's) consent. The visit was conducted pursuant to the emergency declaration under the [facility] [address] and the [employer] and [employer] General Act. Patient identification was verified, and a caregiver was present when appropriate. The patient was located in a state where the provider was licensed to provide care. Alexis Alcantara MD    [phone]  Subjective:   Parris Benjamin is a 19 y.o. F who was seen for:    Diagnosed with anemia in the ER last month after having an episode of numbness. Has started iron supplements since. Hx of heavy menses, not currently on birth control. Has restarted her levothyroxine and Cymbalta 30 mg one month ago. Reports increased anxiety/depression. Mother states that she snores all the time. Reports feeling fatigue, headaches, and memory issues. Current Outpatient Medications:     DULoxetine (CYMBALTA) 60 mg capsule, Take 1 Capsule by mouth daily. , Disp: 90 Capsule, Rfl: 1    levothyroxine (SYNTHROID) 200 mcg tablet, Take 1 Tablet by mouth Daily (before breakfast). , Disp: 90 Tablet, Rfl: 1    FERROUS FUMARATE (IRON PO), Take  by mouth daily. , Disp: , Rfl:     Allergies   Allergen Reactions    Hazelnut Anaphylaxis    Adhesive Tape-Silicones Itching     Paper tape    [place] Swelling    Grass Pollen Unknown (comments)     + testing       Review of Systems   Constitutional: Positive for malaise/fatigue. Negative for fever and weight loss. Respiratory: Negative for cough, hemoptysis, shortness of breath and wheezing. Cardiovascular: Negative for chest pain, palpitations, leg swelling and PND.    Gastrointestinal: Negative for abdominal pain, constipation, diarrhea, nausea and vomiting. Psychiatric/Behavioral: Positive for depression. Negative for suicidal ideas. The patient is nervous/anxious. Objective:   No flowsheet data found. Constitutional: [x] Appears well-developed and well-nourished [x] No apparent distress      [] Abnormal -     Mental status: [x] Alert and awake  [x] Oriented to person/place/time [x] Able to follow commands    [] Abnormal -     Eyes:   EOM    [x]  Normal    [] Abnormal -   Sclera  [x]  Normal    [] Abnormal -          Discharge []  None visible   [] Abnormal -     HENT: [x] Normocephalic, atraumatic  [] Abnormal -   [] Mouth/Throat: Mucous membranes are moist    Neck: [x] No visualized mass [] Abnormal -     Pulmonary/Chest: [x] Respiratory effort normal   [x] No visualized signs of difficulty breathing or respiratory distress        [] Abnormal -         Skin:        [x] No significant exanthematous lesions or discoloration noted on facial skin         [] Abnormal -            Psychiatric:       [x] Normal Affect [] Abnormal -        [x] No Hallucinations    Other pertinent observable physical exam findings:    Assessment & Plan:     Pat Clayton is a 19 y.o. female who presents today for:    1. Acquired hypothyroidism  Will do lab appt in one month. - levothyroxine (SYNTHROID) 200 mcg tablet; Take 1 Tablet by mouth Daily (before breakfast). Dispense: 90 Tablet; Refill: 1  - TSH 3RD GENERATION; Future  - T4 (THYROXINE); Future    2. Anxiety and depression  Increase Cymbalta to 60 mg.  - DULoxetine (CYMBALTA) 60 mg capsule; Take 1 Capsule by mouth daily. Dispense: 90 Capsule; Refill: 1    3. Iron deficiency anemia, unspecified iron deficiency anemia type  - CBC WITH AUTOMATED DIFF; Future  - FERRITIN; Future  - IRON PROFILE; Future    4.  Snoring  - SLEEP MEDICINE REFERRAL       Medications Discontinued During This Encounter   Medication Reason    levothyroxine (SYNTHROID) 75 mcg tablet LIST CLEANUP    montelukast (SINGULAIR) 10 mg tablet LIST CLEANUP    NUVARING 0.12-0.015 mg/24 hr vaginal ring LIST CLEANUP    riboflavin, vitamin B2, 400 mg tab LIST CLEANUP    rizatriptan (MAXALT) 10 mg tablet LIST CLEANUP    fluticasone propionate (FLONASE) 50 mcg/actuation nasal spray LIST CLEANUP    fexofenadine (ALLEGRA) 180 mg tablet LIST CLEANUP    EPIPEN 2-JOSE 0.3 mg/0.3 mL injection LIST CLEANUP    DULoxetine (CYMBALTA) 60 mg capsule LIST CLEANUP    diclofenac EC (VOLTAREN) 75 mg EC tablet LIST CLEANUP    cyclobenzaprine (FLEXERIL) 10 mg tablet LIST CLEANUP    cyanocobalamin (VITAMIN B-12) 1,000 mcg tablet LIST CLEANUP    cholecalciferol, vitamin D3, (VITAMIN D3) 2,000 unit tab LIST CLEANUP    levothyroxine (SYNTHROID) 200 mcg tablet REORDER    DULoxetine (CYMBALTA) 30 mg capsule REORDER           Treatment risks/benefits/costs/interactions/alternatives discussed with patient. Advised patient to call back or return to office if symptoms worsen/change/persist. If patient cannot reach us or should anything more severe/urgent arise he/she should proceed directly to the nearest emergency department. Discussed expected course/resolution/complications of diagnosis in detail with patient. Patient expressed understanding with the diagnosis and plan. This dictation may have been completed with Dragon, the computer voice recognition software. Unanticipated grammatical, syntax, homophones, and other interpretive errors are sometimes inadvertently transcribed by the computer software. Please disregard any errors that have escaped final proofreading. Luis Alfredo Nunez M.D.

## 2022-04-12 ENCOUNTER — OFFICE VISIT (OUTPATIENT)
Dept: FAMILY MEDICINE CLINIC | Age: 22
End: 2022-04-12
Payer: COMMERCIAL

## 2022-04-12 VITALS
OXYGEN SATURATION: 98 % | SYSTOLIC BLOOD PRESSURE: 120 MMHG | TEMPERATURE: 98.2 F | DIASTOLIC BLOOD PRESSURE: 82 MMHG | WEIGHT: 293 LBS | BODY MASS INDEX: 45.99 KG/M2 | RESPIRATION RATE: 16 BRPM | HEIGHT: 67 IN | HEART RATE: 84 BPM

## 2022-04-12 DIAGNOSIS — E03.9 ACQUIRED HYPOTHYROIDISM: ICD-10-CM

## 2022-04-12 DIAGNOSIS — R55 PRE-SYNCOPE: Primary | ICD-10-CM

## 2022-04-12 DIAGNOSIS — F41.9 ANXIETY AND DEPRESSION: ICD-10-CM

## 2022-04-12 DIAGNOSIS — F32.A ANXIETY AND DEPRESSION: ICD-10-CM

## 2022-04-12 PROCEDURE — 99214 OFFICE O/P EST MOD 30 MIN: CPT | Performed by: FAMILY MEDICINE

## 2022-04-12 RX ORDER — LEVOTHYROXINE SODIUM 25 UG/1
TABLET ORAL
Qty: 90 TABLET | Refills: 1 | Status: SHIPPED | OUTPATIENT
Start: 2022-04-12

## 2022-04-12 RX ORDER — HYDROXYZINE HYDROCHLORIDE 10 MG/1
10 TABLET, FILM COATED ORAL
Qty: 20 TABLET | Refills: 0 | Status: SHIPPED | OUTPATIENT
Start: 2022-04-12

## 2022-04-12 RX ORDER — LANOLIN ALCOHOL/MO/W.PET/CERES
1000 CREAM (GRAM) TOPICAL DAILY
COMMUNITY
End: 2022-04-30

## 2022-04-12 RX ORDER — CIDER VINEGAR 300 MG
TABLET ORAL
COMMUNITY

## 2022-04-12 NOTE — PROGRESS NOTES
Chief Complaint   Patient presents with    Complete Physical       1. Have you been to the ER, urgent care clinic since your last visit? Hospitalized since your last visit? Yes When: 04/11/22 Where: [address] Reason for visit: syncope     2. Have you seen or consulted any other health care providers outside of the [address] [place] [address] since your last visit? Include any pap smears or colon screening. No    3. For patients over 45: Has the patient had a colonoscopy? NA - based on age     If the patient is female:    4. For patients over 36: Has the patient had a mammogram? NA - based on age    11. For patients over 21: Has the patient had a pap smear? No    3 most recent PHQ Screens 4/12/2022   Little interest or pleasure in doing things Several days   Feeling down, depressed, irritable, or hopeless Several days   Total Score PHQ 2 2       Abuse Screening Questionnaire 4/12/2022   Do you ever feel afraid of your partner? N   Are you in a relationship with someone who physically or mentally threatens you? N   Is it safe for you to go home?  Virgil Delatorre

## 2022-04-12 NOTE — PROGRESS NOTES
Patient Name: Esperanza Navarrete   MRN: [id number]    Aliyah Aparicio is a 19 y.o. female who presents with the following:     Was seen at [facility] yesterday for another episode of almost fainting. States that it came on suddenly, felt like cold numbness throughout her whole body with numbness around her lips/tongue. Denies LOC. The ER work up was notable for a TSH of 25. Hbg was 10.9 (pt is on iron right now). This is now the 2nd time this has happened; the first episode, she went to the ER and states she had a CT head scan which was normal and a normal EKG. Recently increased her Cymbalta to 60 mg a few weeks ago. Feels like she has occasional panic attacks since these episodes have occurred. Denies anxiety preceding each episode. Denies drug use. Review of Systems   Constitutional: Negative for fever, malaise/fatigue and weight loss. Respiratory: Negative for cough, hemoptysis, shortness of breath and wheezing. Cardiovascular: Negative for chest pain, palpitations, leg swelling and PND. Gastrointestinal: Negative for abdominal pain, constipation, diarrhea, nausea and vomiting. Neurological: Positive for tingling. Psychiatric/Behavioral: The patient is nervous/anxious. The patient's medications, allergies, past medical history, surgical history, family history and social history were reviewed and updated where appropriate. Current Outpatient Medications:     Apple Cider Vinegar 300 mg tab, Take  by mouth., Disp: , Rfl:     cyanocobalamin 1,000 mcg tablet, Take 1,000 mcg by mouth daily. , Disp: , Rfl:     DULoxetine (CYMBALTA) 60 mg capsule, Take 1 Capsule by mouth daily. , Disp: 90 Capsule, Rfl: 1    levothyroxine (SYNTHROID) 200 mcg tablet, Take 1 Tablet by mouth Daily (before breakfast). , Disp: 90 Tablet, Rfl: 1    FERROUS FUMARATE (IRON PO), Take  by mouth daily. , Disp: , Rfl:     Allergies   Allergen Reactions    Hazelnut Anaphylaxis    Adhesive Tape-Silicones Itching Paper tape    [place] Swelling    Grass Pollen Unknown (comments)     + testing         OBJECTIVE    Visit Vitals  /82 (BP 1 Location: Left upper arm, BP Patient Position: Sitting, BP Cuff Size: Large adult)   Pulse 84   Temp 98.2 °F (36.8 °C) (Temporal)   Resp 16   Ht 5' 7\" (1.702 m)   Wt 294 lb 12.8 oz (133.7 kg)   SpO2 98%   BMI 46.17 kg/m²       Physical Exam  Vitals and nursing note reviewed. Constitutional:       General: She is not in acute distress. Appearance: She is not diaphoretic. Eyes:      Conjunctiva/sclera: Conjunctivae normal.      Pupils: Pupils are equal, round, and reactive to light. Cardiovascular:      Rate and Rhythm: Normal rate and regular rhythm. Heart sounds: Normal heart sounds. No murmur heard. No friction rub. No gallop. Pulmonary:      Effort: Pulmonary effort is normal. No respiratory distress. Breath sounds: Normal breath sounds. No wheezing. Skin:     General: Skin is warm and dry. Neurological:      Mental Status: She is alert. ASSESSMENT AND PLAN  Una Mohamud is a 19 y.o. female who presents today for:    1. Pre-syncope  Unclear etiology; recommend pt see neurology for possible seizure rule out.  - REFERRAL TO NEUROLOGY    2. Acquired hypothyroidism  Will increase to total daily dose of 225 mcg daily. Recheck in 6 weeks. - levothyroxine (SYNTHROID) 25 mcg tablet; Take 25 mcg tab with 200 mcg together daily before breakfast (total daily dose of 225 mcg per day)  Dispense: 90 Tablet; Refill: 1    3. Anxiety and depression  Continue same Cymbalta dose and add on prn Atarax. - hydrOXYzine HCL (ATARAX) 10 mg tablet; Take 1 Tablet by mouth daily as needed for Anxiety. Dispense: 20 Tablet; Refill: 0       There are no discontinued medications. Follow-up and Dispositions    · Return in about 6 weeks (around 5/24/2022) for Medication Check.          Treatment risks/benefits/costs/interactions/alternatives discussed with patient. Advised patient to call back or return to office if symptoms worsen/change/persist. If patient cannot reach us or should anything more severe/urgent arise he/she should proceed directly to the nearest emergency department. Discussed expected course/resolution/complications of diagnosis in detail with patient. Patient expressed understanding with the diagnosis and plan. This dictation may have been completed with Dragon, the computer voice recognition software. Unanticipated grammatical, syntax, homophones, and other interpretive errors are sometimes inadvertently transcribed by the computer software. Please disregard any errors that have escaped final proofreading. Aivi N. Caroleen Bamberger M.D.

## 2022-04-19 ENCOUNTER — TELEPHONE (OUTPATIENT)
Dept: SLEEP MEDICINE | Age: 22
End: 2022-04-19

## 2022-04-30 ENCOUNTER — HOSPITAL ENCOUNTER (EMERGENCY)
Age: 22
Discharge: HOME | End: 2022-04-30
Attending: EMERGENCY MEDICINE
Payer: COMMERCIAL

## 2022-04-30 ENCOUNTER — APPOINTMENT (OUTPATIENT)
Dept: GENERAL RADIOLOGY | Age: 22
End: 2022-04-30
Attending: PHYSICIAN ASSISTANT
Payer: COMMERCIAL

## 2022-04-30 ENCOUNTER — APPOINTMENT (OUTPATIENT)
Dept: CT IMAGING | Age: 22
End: 2022-04-30
Attending: PHYSICIAN ASSISTANT
Payer: COMMERCIAL

## 2022-04-30 VITALS
TEMPERATURE: 98.4 F | WEIGHT: 293 LBS | RESPIRATION RATE: 16 BRPM | OXYGEN SATURATION: 99 % | BODY MASS INDEX: 47.58 KG/M2 | DIASTOLIC BLOOD PRESSURE: 71 MMHG | HEART RATE: 92 BPM | SYSTOLIC BLOOD PRESSURE: 130 MMHG

## 2022-04-30 DIAGNOSIS — S62.515A CLOSED NONDISPLACED FRACTURE OF PROXIMAL PHALANX OF LEFT THUMB, INITIAL ENCOUNTER: ICD-10-CM
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
## 2022-04-30 DIAGNOSIS — S62.525A CLOSED NONDISPLACED FRACTURE OF DISTAL PHALANX OF LEFT THUMB, INITIAL ENCOUNTER: ICD-10-CM

## 2022-04-30 DIAGNOSIS — M25.511 ACUTE PAIN OF RIGHT SHOULDER: ICD-10-CM

## 2022-04-30 DIAGNOSIS — S09.90XA INJURY OF HEAD, INITIAL ENCOUNTER: Primary | ICD-10-CM

## 2022-04-30 LAB — HCG UR QL: NEGATIVE

## 2022-04-30 PROCEDURE — 72050 X-RAY EXAM NECK SPINE 4/5VWS: CPT

## 2022-04-30 PROCEDURE — 73030 X-RAY EXAM OF SHOULDER: CPT

## 2022-04-30 PROCEDURE — 81025 URINE PREGNANCY TEST: CPT

## 2022-04-30 PROCEDURE — 73140 X-RAY EXAM OF FINGER(S): CPT

## 2022-04-30 PROCEDURE — 99284 EMERGENCY DEPT VISIT MOD MDM: CPT

## 2022-04-30 PROCEDURE — 74011250637 HC RX REV CODE- 250/637: Performed by: PHYSICIAN ASSISTANT

## 2022-04-30 PROCEDURE — 70450 CT HEAD/BRAIN W/O DYE: CPT

## 2022-04-30 RX ORDER — LANOLIN ALCOHOL/MO/W.PET/CERES
1000 CREAM (GRAM) TOPICAL DAILY
COMMUNITY

## 2022-04-30 RX ORDER — ACETAMINOPHEN 500 MG
1000 TABLET ORAL
Status: COMPLETED | OUTPATIENT
Start: 2022-04-30 | End: 2022-04-30

## 2022-04-30 RX ORDER — LORAZEPAM 0.5 MG/1
0.5 TABLET ORAL
Status: COMPLETED | OUTPATIENT
Start: 2022-04-30 | End: 2022-04-30

## 2022-04-30 RX ORDER — HYDROXYZINE 50 MG/1
25 TABLET, FILM COATED ORAL
Qty: 12 TABLET | Refills: 0 | Status: SHIPPED | OUTPATIENT
Start: 2022-04-30 | End: 2022-05-10

## 2022-04-30 RX ORDER — HYDROXYZINE 50 MG/1
50 TABLET, FILM COATED ORAL
Qty: 12 TABLET | Refills: 0 | Status: SHIPPED | OUTPATIENT
Start: 2022-04-30 | End: 2022-04-30 | Stop reason: SDUPTHER

## 2022-04-30 RX ADMIN — LORAZEPAM 0.5 MG: 0.5 TABLET ORAL at 12:46

## 2022-04-30 RX ADMIN — ACETAMINOPHEN 1000 MG: 500 TABLET ORAL at 12:46

## 2022-04-30 NOTE — ED PROVIDER NOTES
Meng Zaragoza is a 19 y.o. female with PMhx of ADD, scoliosis and kyphosis, hypothyroidism, who presents to ED with cc of headache after head injury. Patient states that she had a mechanical fall due to an Ace wrap around her right ankle which caused her to fall down 7 steps and hit the front right portion of her head on the railing. Reports this was approximately 10 minutes prior to arrival.  She denies loss of consciousness or use of blood thinners. Denies blurry vision, lightheadedness. Has not yet taken anything for her pain. She also endorses some right shoulder pain stating she feels that she hit that side during her fall. Denies paresthesias. She also endorses left thumb pain stating she feels that it dislocated and relocated during the event and now has some swelling. Denies paresthesias here as well. Denies open wound. She denies chest pain, shortness of breath, abdominal pain. Presents with friend who is . Patient also notes history of anxiety, states she feels very anxious at this time as she is concerned that she did something to her head during the fall. States she takes 0.5 Ativan at home as needed for her anxiety. PMHx: Reviewed, as below. PSHx: Reviewed, as below. PCP: Brody Spangler MD    There are no other complaints verbalized at this time.                Past Medical History:   Diagnosis Date    Adverse effect of anesthesia     REQUIRES A LOT OF ANESTHESIA PER MOTHER    Anemia     Anxiety     Attention deficit disorder (ADD)     Closed fracture of metatarsal neck 11/11/2015    [facility], 9/15/15 with Dr. Ricci Lung alignment and healing     Conversion disorder     Depression     Hx of seasonal allergies     Hypothyroidism 1/8/2014    Kyphosis     Kyphosis     Obesity     Scoliosis     Scoliosis     Snoring     Vision decreased     Vitamin D deficiency 1/8/2014    Took 12 weeks of weekly 91620 international units of vitamin d and now on 2000 international units  daily          Past Surgical History:   Procedure Laterality Date    HX BACK SURGERY  2009    Thoracic cage with screws    HX KNEE ARTHROSCOPY  01/2017    LEFT KNEE         Family History:   Problem Relation Age of Onset    Heart Failure Maternal Grandmother     Hypertension Maternal Grandmother     Asthma Maternal Grandmother     Cancer Paternal Grandmother         lung    OSTEOARTHRITIS Mother         psoriatic    Thyroid Disease Mother         Graves    Diabetes Maternal Grandfather     Heart Disease Maternal Grandfather     Diabetes Paternal Grandfather     No Known Problems Father     No Known Problems Sister     No Known Problems Brother     No Known Problems Maternal Aunt     No Known Problems Maternal Uncle     No Known Problems Paternal Aunt     No Known Problems Paternal Uncle     No Known Problems Other        Social History     Socioeconomic History    Marital status: SINGLE     Spouse name: Not on file    Number of children: Not on file    Years of education: Not on file    Highest education level: Not on file   Occupational History    Not on file   Tobacco Use    Smoking status: Never Smoker    Smokeless tobacco: Never Used   Vaping Use    Vaping Use: Never used   Substance and Sexual Activity    Alcohol use: Yes    Drug use: No    Sexual activity: Yes     Partners: Male     Birth control/protection: Inserts   Other Topics Concern    Not on file   Social History Narrative    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    Has several cats    Lives with mom     Social Determinants of Health     Financial Resource Strain:     Difficulty of Paying Living Expenses: Not on file   Food Insecurity:     Worried About Running Out of Food in the Last Year: Not on file    Charline of Food in the Last Year: Not on file   Transportation Needs:     Lack of Transportation (Medical): Not on file    Lack of Transportation (Non-Medical):  Not on file   Physical Activity:     Days of Exercise per Week: Not on file    Minutes of Exercise per Session: Not on file   Stress:     Feeling of Stress : Not on file   Social Connections:     Frequency of Communication with Friends and Family: Not on file    Frequency of Social Gatherings with Friends and Family: Not on file    Attends Hindu Services: Not on file    Active Member of [address] [place] [address] or Organizations: Not on file    Attends Club or Organization Meetings: Not on file    Marital Status: Not on file   Intimate Partner Violence:     Fear of Current or Ex-Partner: Not on file    Emotionally Abused: Not on file    Physically Abused: Not on file    Sexually Abused: Not on file   Housing Stability:     Unable to Pay for Housing in the Last Year: Not on file    Number of [place] in the Last Year: Not on file    Unstable Housing in the Last Year: Not on file         ALLERGIES: Hazelnut, Adhesive tape-silicones, [place], and Grass pollen    Review of Systems   Eyes: Negative for visual disturbance. Respiratory: Negative for shortness of breath. Cardiovascular: Negative for chest pain. Gastrointestinal: Negative for abdominal pain. Musculoskeletal: Positive for arthralgias. Neurological: Positive for headaches. Negative for syncope and light-headedness. All other systems reviewed and are negative. Vitals:    04/30/22 1206   BP: 130/71   Pulse: 92   Resp: 16   Temp: 98.4 °F (36.9 °C)   SpO2: 99%   Weight: 137.8 kg (303 lb 12.7 oz)            Physical Exam  Vitals and nursing note reviewed. Constitutional:       Appearance: She is not diaphoretic. HENT:      Head: No raccoon eyes or Gr's sign. Jaw: There is normal jaw occlusion. Comments: No CSF otorrhea or rhinorrhea. No periocular or occipital bruising. No tenderness to palpation, bogginess or crepitus noted over region of injury, reported to be right frontal region.      Right Ear: Tympanic membrane, ear canal and external ear normal. No drainage. No hemotympanum. Left Ear: Tympanic membrane, ear canal and external ear normal. No drainage. No hemotympanum. Nose: Nose normal.      Right Nostril: No epistaxis or septal hematoma. Left Nostril: No epistaxis or septal hematoma. Mouth/Throat:      Mouth: Mucous membranes are moist.      Pharynx: Oropharynx is clear. Uvula midline. Eyes:      Extraocular Movements: Extraocular movements intact. Conjunctiva/sclera: Conjunctivae normal.      Pupils: Pupils are equal, round, and reactive to light. Cardiovascular:      Rate and Rhythm: Normal rate and regular rhythm. Heart sounds: Normal heart sounds. No murmur heard. No friction rub. No gallop. Pulmonary:      Effort: No respiratory distress. Breath sounds: No stridor. No wheezing, rhonchi or rales. Abdominal:      General: Bowel sounds are normal. There is no distension. Palpations: Abdomen is soft. Tenderness: There is no abdominal tenderness. Musculoskeletal:         General: Swelling (L thumb) present. No deformity. Cervical back: Normal range of motion. No rigidity or bony tenderness. No spinous process tenderness or muscular tenderness. Thoracic back: No bony tenderness. Comments: 5/5 strength to flexion and extension against resistance to left thumb at both MCP and DIP joint. There is to palpation along this region. Sensation intact distally. No open wound. Tenderness to palpation around MCP of thumb. No wrist tenderness to palpation and she has good movement noted to this. No snuffbox tenderness. Skin:     General: Skin is warm and dry. Capillary Refill: Capillary refill takes less than 2 seconds. Neurological:      Mental Status: She is alert and oriented to person, place, and time. GCS: GCS eye subscore is 4. GCS verbal subscore is 5. GCS motor subscore is 6. Cranial Nerves: Cranial nerves are intact.  No cranial nerve deficit (2-12), dysarthria or facial asymmetry. Sensory: Sensation is intact. No sensory deficit (grossly to BLUE and BLLE). Motor: Motor function is intact. No weakness (5/5 strength to biceps, triceps,  strength, to flexion/extension knees and ankles), abnormal muscle tone or seizure activity. Coordination: Finger-Nose-Finger Test and Heel to [employer] normal.          MDM  Number of Diagnoses or Management Options     Amount and/or Complexity of Data Reviewed  Clinical lab tests: ordered and reviewed  Tests in the radiology section of CPT®: ordered and reviewed  Discuss the patient with other providers: yes (Dr Sai Yin, ED attending)           Procedures               VITAL SIGNS:  Vitals:    04/30/22 1206   BP: 130/71   Pulse: 92   Resp: 16   Temp: 98.4 °F (36.9 °C)   SpO2: 99%   Weight: 137.8 kg (303 lb 12.7 oz)         LABS:  Recent Results (from the past 24 hour(s))   HCG URINE, QL. - POC    Collection Time: 04/30/22 12:53 PM   Result Value Ref Range    Pregnancy test,urine (POC) Negative NEG           IMAGING:  CT HEAD WO CONT   Final Result   No acute intracranial abnormality. XR SHOULDER RT AP/LAT MIN 2 V   Final Result   No acute abnormality. XR THUMB LT MIN 2 V   Final Result   1. Acute avulsion fracture of the distal phalanx of the thumb, with a small   avulsive fracture fragment at the volar aspect of the IP joint. 2. Minimal cortical irregularity of the proximal phalanx of the thumb, at the   MCP joint; may represent an additional nondisplaced fracture. XR SPINE CERV 4 OR 5 V   Final Result   No acute fracture. Medications During Visit:  Medications   acetaminophen (TYLENOL) tablet 1,000 mg (1,000 mg Oral Given 4/30/22 1246)   LORazepam (ATIVAN) tablet 0.5 mg (0.5 mg Oral Given 4/30/22 1246)         DECISION MAKING:    Joie Amaral is a 19 y.o. female who comes in as above. Presents after fall down stairs.   States she feels very anxious after the injury and notes history of anxiety. She has been given 0.5 Ativan here as she notes that she had a prescription of this at home and that was her as needed dosing. She states she is out of her medication currently and is requesting some medication for home while she follows up with her counselor. Will discharge with course of as needed hydroxyzine. Neuro exam encouraging without acute deficit and CT head without acute intracranial abnormality. Given fall and complaint of right shoulder pain, x-ray neck and right shoulder were both obtained. These are negative for acute abnormality. Patient also reports she believes she had a dislocation and relocation event to her left thumb. Physical exam overall encouraging where she has tenderness but no indication of tendon rupture as she has good flexion and extension at each joint against resistance. X-ray demonstrates acute avulsion fracture of the distal phalanx of the thumb with a small avulsive fracture fragment at the volar aspect of the IP joint as well as a possible additional nondisplaced fracture at the proximal phalanx of the thumb at the MCP joint. I have discussed with patient and mother this result. Will place in thumb spica splint and have follow-up with orthopedics. I have discussed care with ED attending. Pt has been given close return precautions as well as follow up recommendations. Opportunity for questions presented. Pt and mother verbalizes their understanding and agreement with care plan. IMPRESSION:  1. Injury of head, initial encounter    2. Fall, initial encounter    3. Acute pain of right shoulder    4. Closed nondisplaced fracture of distal phalanx of left thumb, initial encounter    5.  Closed nondisplaced fracture of proximal phalanx of left thumb, initial encounter        DISPOSITION:  Discharged      Discharge Medication List as of 4/30/2022  1:58 PM      CONTINUE these medications which have CHANGED    Details   !! hydrOXYzine HCL (ATARAX) 50 mg tablet Take 0.5 Tablets by mouth every six (6) hours as needed for Anxiety for up to 10 days. , Normal, Disp-12 Tablet, R-0       !! - Potential duplicate medications found. Please discuss with provider. CONTINUE these medications which have NOT CHANGED    Details   cyanocobalamin 1,000 mcg tablet Take 1,000 mcg by mouth daily. , Historical Med      Apple Cider Vinegar 300 mg tab Take  by mouth., Historical Med      !! levothyroxine (SYNTHROID) 25 mcg tablet Take 25 mcg tab with 200 mcg together daily before breakfast (total daily dose of 225 mcg per day), Normal, Disp-90 Tablet, R-1      !! hydrOXYzine HCL (ATARAX) 10 mg tablet Take 1 Tablet by mouth daily as needed for Anxiety., Normal, Disp-20 Tablet, R-0      DULoxetine (CYMBALTA) 60 mg capsule Take 1 Capsule by mouth daily. , Normal, Disp-90 Capsule, R-1Dose change      !! levothyroxine (SYNTHROID) 200 mcg tablet Take 1 Tablet by mouth Daily (before breakfast). , Normal, Disp-90 Tablet, R-1      FERROUS FUMARATE (IRON PO) Take  by mouth daily. , Historical Med       !! - Potential duplicate medications found. Please discuss with provider. Follow-up Information     Follow up With Specialties Details Why Contact Info    Bigg Stevenson MD Family Medicine Schedule an appointment as soon as possible for a visit   [address] [place] [address] [phone] [facility] Emergency Medicine Go to  As needed, If symptoms worsen [address] [place] [id number] [phone]    Ronni Worthy MD Orthopedic Surgery, Hand Surgery Schedule an appointment as soon as possible for a visit   [facility] [phone]               The patient is asked to follow-up with their primary care provider and any other physicians as above.   We have discussed strict return precautions and the patient is asked to return promptly for any increased concerns or worsening of symptoms and for return precautions regarding their symptoms today. They can return to this emergency department or any other emergency department. I have discussed with them results as above and presented opportunity for questions. They verbalize their understanding of the above and agreement with care plan. Please note that this dictation was completed with [employer], the computer voice recognition software. Quite often unanticipated grammatical, syntax, homophones, and other interpretive errors are inadvertently transcribed by the computer software. Please disregard these errors. Please excuse any errors that have escaped final proofreading.

## 2022-04-30 NOTE — ED TRIAGE NOTES
Patient arrives with c/o tripping and falling down 7 steps and hitting her head on wooden railing or steps 10 minutes ago. Denies LOC. Denies blood thinner. Denies blurry vision, lightheaded. Verbalizes headache. Denies taking anything for pain. Also c/o left thumb pain.

## 2022-04-30 NOTE — ED NOTES
Pt resting on stretcher with mother at bedside. Pt reports still feeling very anxious, provider notified.

## 2022-04-30 NOTE — ED NOTES
Thumb spica wrist splint placed on left wrist. Pt tolerated well. Post procedure pain and neurovascular assessment wnl. Pt given dc instructions and reviewed f/u info and prescriptions. Verbalized understanding. Pt ambulatory out of dept with mother with NAD.

## 2022-05-02 ENCOUNTER — OFFICE VISIT (OUTPATIENT)
Dept: ORTHOPEDIC SURGERY | Age: 22
End: 2022-05-02
Payer: COMMERCIAL

## 2022-05-02 DIAGNOSIS — S62.502A AVULSION FRACTURE OF LEFT THUMB, CLOSED, INITIAL ENCOUNTER: Primary | ICD-10-CM

## 2022-05-02 DIAGNOSIS — M79.645 PAIN OF LEFT THUMB: ICD-10-CM

## 2022-05-02 DIAGNOSIS — S63.642A SPRAIN OF METACARPOPHALANGEAL (MCP) JOINT OF LEFT THUMB, INITIAL ENCOUNTER: ICD-10-CM

## 2022-05-02 PROCEDURE — 99203 OFFICE O/P NEW LOW 30 MIN: CPT | Performed by: PHYSICIAN ASSISTANT

## 2022-05-02 NOTE — PROGRESS NOTES
HPI: Gertrudis King (: 2000) is a 19 y.o. female, patient, here for evaluation of the following chief complaint(s): Patient presents with complaint of left thumb pain and right shoulder pain. On 2022, she fell down 10 steps at home and recalls hyperextending her left thumb hitting her right shoulder and hitting her head. She reports it felt as though the thumb dislocated and reduced. She was evaluated at the emergency department and x-rays of the left thumb and the right shoulder. X-rays of the thumb reveal an avulsion fracture of the distal phalanx and a possible fracture at the proximal phalanx. The shoulder is unremarkable for fracture. She was given a thumb spica wrist splint and ultimately referred to us. No other complaints or concerns. She was accompanied by her mother. X-rays available for review in PACS. Thumb Pain (Left)       Vitals:  Ht 5' 8\" (1.727 m)   Wt 299 lb (135.6 kg)   BMI 45.46 kg/m²    Body mass index is 45.46 kg/m². Allergies   Allergen Reactions    Hazelnut Anaphylaxis    Adhesive Tape-Silicones Itching     Paper tape    [place] Swelling    Grass Pollen Unknown (comments)     + testing       Current Outpatient Medications   Medication Sig    cyanocobalamin 1,000 mcg tablet Take 1,000 mcg by mouth daily. (Patient not taking: Reported on 5/3/2022)    hydrOXYzine HCL (ATARAX) 50 mg tablet Take 0.5 Tablets by mouth every six (6) hours as needed for Anxiety for up to 10 days. (Patient not taking: Reported on 5/3/2022)    Apple Cider Vinegar 300 mg tab Take  by mouth. (Patient not taking: Reported on 5/3/2022)    levothyroxine (SYNTHROID) 25 mcg tablet Take 25 mcg tab with 200 mcg together daily before breakfast (total daily dose of 225 mcg per day) (Patient not taking: Reported on 5/3/2022)    hydrOXYzine HCL (ATARAX) 10 mg tablet Take 1 Tablet by mouth daily as needed for Anxiety.  (Patient not taking: Reported on 5/3/2022)    DULoxetine (CYMBALTA) 60 mg capsule Take 1 Capsule by mouth daily. (Patient not taking: Reported on 5/3/2022)    levothyroxine (SYNTHROID) 200 mcg tablet Take 1 Tablet by mouth Daily (before breakfast). (Patient not taking: Reported on 5/3/2022)    FERROUS FUMARATE (IRON PO) Take  by mouth daily. (Patient not taking: Reported on 5/3/2022)     No current facility-administered medications for this visit.        Past Medical History:   Diagnosis Date    Adverse effect of anesthesia     REQUIRES A LOT OF ANESTHESIA PER MOTHER    Anemia     Anxiety     Attention deficit disorder (ADD)     Closed fracture of metatarsal neck 11/11/2015    [facility], 9/15/15 with Dr. Malcolm Saeed alignment and healing     Conversion disorder     Depression     Hx of seasonal allergies     Hypothyroidism 1/8/2014    Kyphosis     Kyphosis     Obesity     Scoliosis     Scoliosis     Snoring     Vision decreased     Vitamin D deficiency 1/8/2014    Took 12 weeks of weekly 70711 international units of vitamin d and now on 2000 international units  daily           Past Surgical History:   Procedure Laterality Date    HX BACK SURGERY  2009    Thoracic cage with screws    HX KNEE ARTHROSCOPY  01/2017    LEFT KNEE       Family History   Problem Relation Age of Onset    Heart Failure Maternal Grandmother     Hypertension Maternal Grandmother     Asthma Maternal Grandmother     Cancer Paternal Grandmother         lung    OSTEOARTHRITIS Mother         psoriatic    Thyroid Disease Mother         Graves    Diabetes Maternal Grandfather     Heart Disease Maternal Grandfather     Diabetes Paternal Grandfather     No Known Problems Father     No Known Problems Sister     No Known Problems Brother     No Known Problems Maternal Aunt     No Known Problems Maternal Uncle     No Known Problems Paternal Aunt     No Known Problems Paternal Uncle     No Known Problems Other         Social History     Tobacco Use    Smoking status: Never Smoker    Smokeless tobacco: Never Used   Vaping Use    Vaping Use: Never used   Substance Use Topics    Alcohol use: Yes    Drug use: No        Review of Systems    Constitutional: No fevers, chills, night sweats, excessive fatigue or weight loss. Musculoskeletal: No joint pain, swelling or redness. No decreased range of motion. Neurologic: No headache, blurred vision, and no areas of focal weakness or numbness. Normal gait. No sensory problems. Respiratory: No dyspnea on exertion, orthopnea, chest pain, cough or hemoptysis. Cardiovascular: No anginal chest pain, irregular heart beat, tachycardia, palpitations or orthopnea  Integumentary: No chronic rashes, inflammation, ulcerations, pruritus, petechiae, purpura, ecchymoses, or skin changes      Physical Exam    General: Alert, cooperative, no distress  Musculosketal: Left hand - Limited range of motion to the thumb. She can flex and extend at the MP and IP joints with hesitation related to pain. Ecchymosis at the distal phalanx. Difficult to evaluate joint laxity given her level of pain. No mallet deformity. Neurologic:  CNII-XII intact, Normal strength, sensation, and reflexes throughout    Imaging:  IMPRESSION  1. Acute avulsion fracture of the distal phalanx of the thumb, with a small  avulsive fracture fragment at the volar aspect of the IP joint. 2. Minimal cortical irregularity of the proximal phalanx of the thumb, at the  MCP joint; may represent an additional nondisplaced fracture.      ASSESSMENT/PLAN:  Below is the assessment and plan developed based on review of pertinent history, physical exam, labs, studies, and medications. Left thumb pain and right shoulder pain. On 5/1/2022, she fell down 10 steps at home and recalls hyperextending her left thumb hitting her right shoulder and hitting her head. She reports it felt as though the thumb dislocated and reduced.   She was evaluated at the emergency department and x-rays of the left thumb and the right shoulder. X-rays of the thumb reveal an avulsion fracture of the distal phalanx and a possible fracture at the proximal phalanx. The shoulder is unremarkable for fracture. She was given a thumb spica wrist splint and ultimately referred to us. She will continue with the thumb spice brace to be worn consistently for comfort and support. Gentle range of motion while out of the brace to bathe. She has an appointment with Dr. Neha Perez to evaluate the right shoulder. She will follow-up in the office in 3 weeks for repeat thumb x-rays. She will follow-up sooner as needed. Note for work provided stating she will be out on 5/3/22. 1. Avulsion fracture of left thumb, closed, initial encounter  2. Pain of left thumb  3. Sprain of metacarpophalangeal (MCP) joint of left thumb, initial encounter      Return in about 4 weeks (around 5/30/2022). Dr. Stewart Maki was available for immediate consult during this encounter. An electronic signature was used to authenticate this note.   -- Abhinav Jordan PA-C

## 2022-05-02 NOTE — LETTER
5/2/2022 2:29 PM    Ms. Tessa [address]iwal [address] [id number]    Ms. Satnam Benitez is under our care for a right shoulder and left hand injury. She may not return to work until 5/9/22. Thank you for your understanding.     Questions or concerns, please call [phone]          Sincerely,      Lisset Phoenix PA-C

## 2022-05-03 VITALS — HEIGHT: 68 IN | BODY MASS INDEX: 44.41 KG/M2 | WEIGHT: 293 LBS

## 2022-05-03 PROBLEM — S62.502A: Status: ACTIVE | Noted: 2022-05-03

## 2022-05-03 PROBLEM — M79.645 PAIN OF LEFT THUMB: Status: ACTIVE | Noted: 2022-05-03

## 2022-05-03 PROBLEM — S63.642A SPRAIN OF METACARPOPHALANGEAL JOINT OF LEFT THUMB: Status: ACTIVE | Noted: 2022-05-03

## 2022-05-04 ENCOUNTER — OFFICE VISIT (OUTPATIENT)
Dept: ORTHOPEDIC SURGERY | Age: 22
End: 2022-05-04
Payer: COMMERCIAL

## 2022-05-04 VITALS — HEIGHT: 68 IN | BODY MASS INDEX: 44.41 KG/M2 | WEIGHT: 293 LBS

## 2022-05-04 DIAGNOSIS — S43.51XA SPRAIN OF RIGHT ACROMIOCLAVICULAR LIGAMENT, INITIAL ENCOUNTER: Primary | ICD-10-CM

## 2022-05-04 PROCEDURE — 99203 OFFICE O/P NEW LOW 30 MIN: CPT | Performed by: ORTHOPAEDIC SURGERY

## 2022-05-04 NOTE — PROGRESS NOTES
Allison Nash (: 2000) is a 19 y.o. female, patient, here for evaluation of the following chief complaint(s):  Shoulder Pain (right shoulder pain)     Patient presents the office today with a chief complaint of  HPI:    Right shoulder pain. Patient describes injury occurred when she slipped going down the steps and fell about 10 steps down. She is mostly having some hand and thumb pain of which she is seeing another physician for. However, she is noted some level of discomfort across the anterior lateral aspect of the shoulder. She is not noted any swelling or black and blue. Pain is gotten a little bit worse. She did go to a local emergency room. She had x-rays performed. She is here today with her mother. Allergies   Allergen Reactions    Hazelnut Anaphylaxis    Adhesive Tape-Silicones Itching     Paper tape    [place] Swelling    Grass Pollen Unknown (comments)     + testing       Current Outpatient Medications   Medication Sig    cyanocobalamin 1,000 mcg tablet Take 1,000 mcg by mouth daily. (Patient not taking: Reported on 5/3/2022)    hydrOXYzine HCL (ATARAX) 50 mg tablet Take 0.5 Tablets by mouth every six (6) hours as needed for Anxiety for up to 10 days. (Patient not taking: Reported on 5/3/2022)    Apple Cider Vinegar 300 mg tab Take  by mouth. (Patient not taking: Reported on 5/3/2022)    levothyroxine (SYNTHROID) 25 mcg tablet Take 25 mcg tab with 200 mcg together daily before breakfast (total daily dose of 225 mcg per day) (Patient not taking: Reported on 5/3/2022)    hydrOXYzine HCL (ATARAX) 10 mg tablet Take 1 Tablet by mouth daily as needed for Anxiety. (Patient not taking: Reported on 5/3/2022)    DULoxetine (CYMBALTA) 60 mg capsule Take 1 Capsule by mouth daily. (Patient not taking: Reported on 5/3/2022)    levothyroxine (SYNTHROID) 200 mcg tablet Take 1 Tablet by mouth Daily (before breakfast).  (Patient not taking: Reported on 5/3/2022)    FERROUS FUMARATE (IRON PO) Take  by mouth daily. (Patient not taking: Reported on 5/3/2022)     No current facility-administered medications for this visit.        Past Medical History:   Diagnosis Date    Adverse effect of anesthesia     REQUIRES A LOT OF ANESTHESIA PER MOTHER    Anemia     Anxiety     Attention deficit disorder (ADD)     Closed fracture of metatarsal neck 11/11/2015    [facility], 9/15/15 with Dr. Shelia Lipscomb alignment and healing     Conversion disorder     Depression     Hx of seasonal allergies     Hypothyroidism 1/8/2014    Kyphosis     Kyphosis     Obesity     Scoliosis     Scoliosis     Snoring     Vision decreased     Vitamin D deficiency 1/8/2014    Took 12 weeks of weekly 65751 international units of vitamin d and now on 2000 international units  daily           Past Surgical History:   Procedure Laterality Date    HX BACK SURGERY  2009    Thoracic cage with screws    HX KNEE ARTHROSCOPY  01/2017    LEFT KNEE       Family History   Problem Relation Age of Onset    Heart Failure Maternal Grandmother     Hypertension Maternal Grandmother     Asthma Maternal Grandmother     Cancer Paternal Grandmother         lung    OSTEOARTHRITIS Mother         psoriatic    Thyroid Disease Mother         Graves    Diabetes Maternal Grandfather     Heart Disease Maternal Grandfather     Diabetes Paternal Grandfather     No Known Problems Father     No Known Problems Sister     No Known Problems Brother     No Known Problems Maternal Aunt     No Known Problems Maternal Uncle     No Known Problems Paternal Aunt     No Known Problems Paternal Uncle     No Known Problems Other         Social History     Socioeconomic History    Marital status: SINGLE     Spouse name: Not on file    Number of children: Not on file    Years of education: Not on file    Highest education level: Not on file   Occupational History    Not on file   Tobacco Use    Smoking status: Never Smoker    Smokeless tobacco: Never Used   Vaping Use    Vaping Use: Never used   Substance and Sexual Activity    Alcohol use: Yes    Drug use: No    Sexual activity: Yes     Partners: Male     Birth control/protection: Inserts   Other Topics Concern    Not on file   Social History Narrative    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    Has several cats    Lives with mom     Social Determinants of Health     Financial Resource Strain:     Difficulty of Paying Living Expenses: Not on file   Food Insecurity:     Worried About Running Out of Food in the Last Year: Not on file    Charline of Food in the Last Year: Not on file   Transportation Needs:     Lack of Transportation (Medical): Not on file    Lack of Transportation (Non-Medical): Not on file   Physical Activity:     Days of Exercise per Week: Not on file    Minutes of Exercise per Session: Not on file   Stress:     Feeling of Stress : Not on file   Social Connections:     Frequency of Communication with Friends and Family: Not on file    Frequency of Social Gatherings with Friends and Family: Not on file    Attends Taoist Services: Not on file    Active Member of [address] [place] [address] or Organizations: Not on file    Attends Club or Organization Meetings: Not on file    Marital Status: Not on file   Intimate Partner Violence:     Fear of Current or Ex-Partner: Not on file    Emotionally Abused: Not on file    Physically Abused: Not on file    Sexually Abused: Not on file   Housing Stability:     Unable to Pay for Housing in the Last Year: Not on file    Number of [place] in the Last Year: Not on file    Unstable Housing in the Last Year: Not on file       Review of Systems   Musculoskeletal:        Right shoulder pain       Vitals:  Ht 5' 8\" (1.727 m)   Wt 299 lb (135.6 kg)   BMI 45.46 kg/m²    Body mass index is 45.46 kg/m². Ortho Exam     Patient is alert and oriented x3. Patient is in no acute distress. Patient ambulates with a nonantalgic gait.            Right shoulder: Patient has had prior x-rays which reveal no evidence of fracture or dislocation. There is no edema or ecchymosis noted. No soft tissue swelling is noted. Patient has 150 degrees of forward elevation, 130 degrees lateral duction 60 degrees of external rotation. Pain is noted mostly along the anterior aspect of the shoulder. She does have some pain at along the acromioclavicular joint. Her strength is maintained with forward elevation, lateral abduction external rotation. She has no crepitation or instability. Neurovascular examination is intact. Left Shoulder:  No shoulder girdle atrophy . There is no soft tissue swelling, ecchymosis, abrasions or lacerations. Active range of motion is full with forward flexion, lateral abduction and external rotation. Internal rotation is to the upper lumbar level with a negative lift-off sign. Passive range of motion is full with a negative impingement sign and a negative Santos sign. Rotator cuff strength with forward flexion, lateral abduction and external rotation is intact with 5/5 strength. There is no crepitation about the joint. [facility] joint does not reproduce discomfort, and there is no pain elicited with cross-body adduction. Strength of the extremity is 5/5 at biceps/triceps/wrist extension. DRT's are intact at +2/4 and  symmetrical.  Cervical range of motion is full with no pain to palpation along the paraspinal musculature medial border of the scapula. Spurling's sign is negative. Patient has had prior x-rays which reveal no evidence of fracture or dislocation. ASSESSMENT/PLAN:    Patient symptoms are most consistent with acromioclavicular joint sprain. I suspect in due time this pain should pass. I encouraged her to work on range of motion exercises. If her pain does not resolve over the next 10 to 14 days, I am happy to see her back. An MRI evaluation may be indicated.    patient agrees  and is to follow-up as needed        Janay Hoff MD

## 2022-06-05 ENCOUNTER — HOSPITAL ENCOUNTER (EMERGENCY)
Age: 22
Discharge: HOME | End: 2022-06-05
Attending: EMERGENCY MEDICINE
Payer: COMMERCIAL

## 2022-06-05 VITALS
HEART RATE: 77 BPM | DIASTOLIC BLOOD PRESSURE: 67 MMHG | HEIGHT: 68 IN | TEMPERATURE: 98 F | OXYGEN SATURATION: 96 % | BODY MASS INDEX: 44.41 KG/M2 | WEIGHT: 293 LBS | RESPIRATION RATE: 18 BRPM | SYSTOLIC BLOOD PRESSURE: 121 MMHG

## 2022-06-05 DIAGNOSIS — T78.40XA ALLERGIC REACTION, INITIAL ENCOUNTER: Primary | ICD-10-CM

## 2022-06-05 PROCEDURE — 99284 EMERGENCY DEPT VISIT MOD MDM: CPT

## 2022-06-05 PROCEDURE — 96374 THER/PROPH/DIAG INJ IV PUSH: CPT

## 2022-06-05 PROCEDURE — 74011250636 HC RX REV CODE- 250/636: Performed by: EMERGENCY MEDICINE

## 2022-06-05 PROCEDURE — 74011250636 HC RX REV CODE- 250/636

## 2022-06-05 PROCEDURE — 93005 ELECTROCARDIOGRAM TRACING: CPT

## 2022-06-05 PROCEDURE — 74011636637 HC RX REV CODE- 636/637: Performed by: EMERGENCY MEDICINE

## 2022-06-05 RX ORDER — PREDNISONE 20 MG/1
60 TABLET ORAL
Status: COMPLETED | OUTPATIENT
Start: 2022-06-05 | End: 2022-06-05

## 2022-06-05 RX ORDER — DIPHENHYDRAMINE HYDROCHLORIDE 50 MG/ML
25 INJECTION, SOLUTION INTRAMUSCULAR; INTRAVENOUS ONCE
Status: COMPLETED | OUTPATIENT
Start: 2022-06-05 | End: 2022-06-05

## 2022-06-05 RX ORDER — DIPHENHYDRAMINE HYDROCHLORIDE 50 MG/ML
INJECTION, SOLUTION INTRAMUSCULAR; INTRAVENOUS
Status: COMPLETED
Start: 2022-06-05 | End: 2022-06-05

## 2022-06-05 RX ADMIN — PREDNISONE 60 MG: 20 TABLET ORAL at 21:28

## 2022-06-05 RX ADMIN — DIPHENHYDRAMINE HYDROCHLORIDE 25 MG: 50 INJECTION INTRAMUSCULAR; INTRAVENOUS at 22:00

## 2022-06-05 RX ADMIN — DIPHENHYDRAMINE HYDROCHLORIDE 25 MG: 50 INJECTION, SOLUTION INTRAMUSCULAR; INTRAVENOUS at 22:00

## 2022-06-06 LAB
ATRIAL RATE: 85 BPM
CALCULATED P AXIS, ECG09: 16 DEGREES
CALCULATED R AXIS, ECG10: 11 DEGREES
CALCULATED T AXIS, ECG11: 18 DEGREES
DIAGNOSIS, 93000: NORMAL
P-R INTERVAL, ECG05: 144 MS
Q-T INTERVAL, ECG07: 396 MS
QRS DURATION, ECG06: 86 MS
QTC CALCULATION (BEZET), ECG08: 471 MS
VENTRICULAR RATE, ECG03: 85 BPM

## 2022-06-06 NOTE — ED PROVIDER NOTES
History of anemia, anxiety, ADD, depression, allergies, hypothyroidism, obesity. She presents companied by friend with complaints of a suspected allergic reaction. She states that she was at a karaoke bar when her symptoms began. She ate a piece of a chocolate ice cream pie. She noted some type of knot in it while she was eating it. Soon after, she began having some facial itching, lip swelling, and felt a lump in her throat. The symptoms have improved since onset. No treatment prior to arrival.  Her symptoms are moderate.            Past Medical History:   Diagnosis Date    Adverse effect of anesthesia     REQUIRES A LOT OF ANESTHESIA PER MOTHER    Anemia     Anxiety     Attention deficit disorder (ADD)     Closed fracture of metatarsal neck 11/11/2015    [facility], 9/15/15 with Dr. Anita Domínguez alignment and healing     Conversion disorder     Depression     Hx of seasonal allergies     Hypothyroidism 1/8/2014    Kyphosis     Kyphosis     Obesity     Scoliosis     Scoliosis     Snoring     Vision decreased     Vitamin D deficiency 1/8/2014    Took 12 weeks of weekly 59178 international units of vitamin d and now on 2000 international units  daily          Past Surgical History:   Procedure Laterality Date    HX BACK SURGERY  2009    Thoracic cage with screws    HX KNEE ARTHROSCOPY  01/2017    LEFT KNEE         Family History:   Problem Relation Age of Onset    Heart Failure Maternal Grandmother     Hypertension Maternal Grandmother     Asthma Maternal Grandmother     Cancer Paternal Grandmother         lung    OSTEOARTHRITIS Mother         psoriatic    Thyroid Disease Mother         Graves    Diabetes Maternal Grandfather     Heart Disease Maternal Grandfather     Diabetes Paternal Grandfather     No Known Problems Father     No Known Problems Sister     No Known Problems Brother     No Known Problems Maternal Aunt     No Known Problems Maternal Uncle     No Known Problems Paternal Aunt     No Known Problems Paternal Uncle     No Known Problems Other        Social History     Socioeconomic History    Marital status: SINGLE     Spouse name: Not on file    Number of children: Not on file    Years of education: Not on file    Highest education level: Not on file   Occupational History    Not on file   Tobacco Use    Smoking status: Never Smoker    Smokeless tobacco: Never Used   Vaping Use    Vaping Use: Never used   Substance and Sexual Activity    Alcohol use: Yes    Drug use: No    Sexual activity: Yes     Partners: Male     Birth control/protection: Inserts   Other Topics Concern    Not on file   Social History Narrative    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    Has several cats    Lives with mom     Social Determinants of Health     Financial Resource Strain:     Difficulty of Paying Living Expenses: Not on file   Food Insecurity:     Worried About Running Out of Food in the Last Year: Not on file    Charline of Food in the Last Year: Not on file   Transportation Needs:     Lack of Transportation (Medical): Not on file    Lack of Transportation (Non-Medical):  Not on file   Physical Activity:     Days of Exercise per Week: Not on file    Minutes of Exercise per Session: Not on file   Stress:     Feeling of Stress : Not on file   Social Connections:     Frequency of Communication with Friends and Family: Not on file    Frequency of Social Gatherings with Friends and Family: Not on file    Attends Evangelical Services: Not on file    Active Member of Clubs or Organizations: Not on file    Attends Club or Organization Meetings: Not on file    Marital Status: Not on file   Intimate Partner Violence:     Fear of Current or Ex-Partner: Not on file    Emotionally Abused: Not on file    Physically Abused: Not on file    Sexually Abused: Not on file   Housing Stability:     Unable to Pay for Housing in the Last Year: Not on file    Number of [place] in the Last Year: Not on file    Unstable Housing in the Last Year: Not on file         ALLERGIES: Hazelnut, Adhesive tape-silicones, [place], and Grass pollen    Review of Systems   All other systems reviewed and are negative. Vitals:    06/05/22 2109   BP: (!) 150/102   Pulse: 87   Resp: 18   Temp: 97.6 °F (36.4 °C)   SpO2: 99%   Weight: 140.8 kg (310 lb 6.5 oz)   Height: 5' 7.5\" (1.715 m)            Physical Exam  Vitals and nursing note reviewed. Constitutional:       Appearance: She is well-developed. HENT:      Head: Normocephalic and atraumatic. Mouth/Throat:      Mouth: Mucous membranes are moist.      Pharynx: Oropharynx is clear. Eyes:      Conjunctiva/sclera: Conjunctivae normal.   Neck:      Trachea: No tracheal deviation. Cardiovascular:      Rate and Rhythm: Normal rate and regular rhythm. Heart sounds: Normal heart sounds. No murmur heard. No friction rub. No gallop. Pulmonary:      Effort: Pulmonary effort is normal.      Breath sounds: Normal breath sounds. Abdominal:      Palpations: Abdomen is soft. Tenderness: There is no abdominal tenderness. Musculoskeletal:         General: No deformity. Cervical back: Neck supple. Skin:     General: Skin is warm and dry. Neurological:      Mental Status: She is alert. Comments: oriented          MDM       Procedures    EKG: Normal sinus rhythm; rate of 85; normal STKerry MD  10:06 PM    Progress note: She feels much better. Her heart rate was noted to go up to approximately 180. She states that her heart races when she is anxious. ?  SVT. Will refer to cardiology. Doug Boyce MD  10:30 PM    Assessment/plan: Suspected allergic reaction. Reassuring appearance/exam with stable vital signs. She feels better after Benadryl in the ED. Home with continue Benadryl as needed. PCP follow-up. Return precautions discussed.   Doug Boyce MD  10:30 PM

## 2022-06-06 NOTE — ED TRIAGE NOTES
Emergency Room Nursing Note        Patient Name: Giorgi Martin      : 2000             MRN: [id number]      Chief Complaint:  Allergic Reaction      Admit Diagnosis: No admission diagnoses are documented for this encounter. Admitting Provider: No admitting provider for patient encounter. Surgery: * No surgery found *           Patient arrived to the ER ambulatory from [facility] with complaints of an Allergic Reaction to a pie that she ate at the bar. Pt states that she started itching and felt that her lips started swelling. Pt also states that she has severe Anxiety and thinks that it might be contributing to her symptoms. Pt states a Hx of Hypothyroidism & a prior Back Surgery for Scoliosis.           Lines:        Signed by: Robert Chávez RN, LEESA, BSN, [facility]                                              2022 at 9:11 PM

## 2022-06-06 NOTE — ROUTINE PROCESS
Emergency Room Nursing Note        Patient Name: Joie Amaral      : 2000             MRN: [id number]      Chief Complaint: Allergic Reaction      Admit Diagnosis: No admission diagnoses are documented for this encounter. Surgery: * No surgery found *            MD reviewed discharge instructions and options with patient. Patient verbalized understanding. RN reviewed discharge instructions using teach back method. Patient ambulatory to exit without difficulty and no acute signs of distress. No complaints or needs expressed at this time. Patient counseled on medications prescribed at discharge (If prescribed). Vital signs stable. Patient to follow up with PCP/Specialist on the next business day for appointment. All questions answered by ER RN.           Lines:        Vitals: Patient Vitals for the past 12 hrs:   Temp Pulse Resp BP SpO2   22 2230 98 °F (36.7 °C) 77 18 121/67 96 %   22 2200  85 18 131/75 97 %   22 2130    132/82 100 %   22 2109 97.6 °F (36.4 °C) 87 18 (!) 150/102 99 %         Signed by: Karen Del Rio RN, LEESA, BSN, [facility]                                              2022 at 10:49 PM

## 2022-06-07 ENCOUNTER — OFFICE VISIT (OUTPATIENT)
Dept: ORTHOPEDIC SURGERY | Age: 22
End: 2022-06-07

## 2022-06-07 VITALS — HEIGHT: 67 IN | WEIGHT: 293 LBS | BODY MASS INDEX: 45.99 KG/M2

## 2022-06-07 DIAGNOSIS — S62.502A AVULSION FRACTURE OF LEFT THUMB, CLOSED, INITIAL ENCOUNTER: ICD-10-CM

## 2022-06-07 DIAGNOSIS — M79.645 PAIN OF LEFT THUMB: Primary | ICD-10-CM

## 2022-06-07 DIAGNOSIS — S63.642D SPRAIN OF METACARPOPHALANGEAL (MCP) JOINT OF LEFT THUMB, SUBSEQUENT ENCOUNTER: ICD-10-CM

## 2022-06-07 NOTE — PROGRESS NOTES
HPI: Keyla Lopez (: 2000) is a 19 y.o. female, patient, here for evaluation of the following chief complaint(s):Patient presents with complaint of left thumb pain and right shoulder pain. On 2022, she fell down 10 steps at home and recalls hyperextending her left thumb hitting her right shoulder and hitting her head. She reports it felt as though the thumb dislocated and reduced. She was evaluated at the emergency department and x-rays of the left thumb and the right shoulder. X-rays of the thumb reveal an avulsion fracture of the distal phalanx and a possible fracture at the proximal phalanx. The shoulder is unremarkable for fracture. She was given a thumb spica wrist splint and ultimately referred to us. She presents today in follow-up. She has been wearing the thumb spica brace off and on she reports. She continues to experience inability to flex the thumb at the IP joint. She does have good opposition, but cannot slide her thumb down the little finger as the IP joint will not flex enough to do so. .  During her previous visit she was complaining of right shoulder pain. She was evaluated by Dr. Neva Corley. She is accompanied by a family member. X-rays of the left thumb obtained today. Thumb Pain (Left )       Vitals:  Ht 5' 7\" (1.702 m)   Wt 295 lb (133.8 kg)   BMI 46.20 kg/m²    Body mass index is 46.2 kg/m². Allergies   Allergen Reactions    Hazelnut Anaphylaxis    Adhesive Tape-Silicones Itching     Paper tape    [place] Swelling    Grass Pollen Unknown (comments)     + testing       Current Outpatient Medications   Medication Sig    cyanocobalamin 1,000 mcg tablet Take 1,000 mcg by mouth daily. (Patient not taking: Reported on 5/3/2022)    Apple Cider Vinegar 300 mg tab Take  by mouth.  (Patient not taking: Reported on 5/3/2022)    levothyroxine (SYNTHROID) 25 mcg tablet Take 25 mcg tab with 200 mcg together daily before breakfast (total daily dose of 225 mcg per day) (Patient not taking: Reported on 5/3/2022)    hydrOXYzine HCL (ATARAX) 10 mg tablet Take 1 Tablet by mouth daily as needed for Anxiety. (Patient not taking: Reported on 5/3/2022)    DULoxetine (CYMBALTA) 60 mg capsule Take 1 Capsule by mouth daily. (Patient not taking: Reported on 5/3/2022)    levothyroxine (SYNTHROID) 200 mcg tablet Take 1 Tablet by mouth Daily (before breakfast). (Patient not taking: Reported on 5/3/2022)    FERROUS FUMARATE (IRON PO) Take  by mouth daily. (Patient not taking: Reported on 5/3/2022)     No current facility-administered medications for this visit.        Past Medical History:   Diagnosis Date    Adverse effect of anesthesia     REQUIRES A LOT OF ANESTHESIA PER MOTHER    Anemia     Anxiety     Attention deficit disorder (ADD)     Closed fracture of metatarsal neck 11/11/2015    [facility], 9/15/15 with Dr. Lennie Hernandez alignment and healing     Conversion disorder     Depression     Hx of seasonal allergies     Hypothyroidism 1/8/2014    Kyphosis     Kyphosis     Obesity     Scoliosis     Scoliosis     Snoring     Vision decreased     Vitamin D deficiency 1/8/2014    Took 12 weeks of weekly 97599 international units of vitamin d and now on 2000 international units  daily           Past Surgical History:   Procedure Laterality Date    HX BACK SURGERY  2009    Thoracic cage with screws    HX KNEE ARTHROSCOPY  01/2017    LEFT KNEE       Family History   Problem Relation Age of Onset    Heart Failure Maternal Grandmother     Hypertension Maternal Grandmother     Asthma Maternal Grandmother     Cancer Paternal Grandmother         lung    OSTEOARTHRITIS Mother         psoriatic    Thyroid Disease Mother         Graves    Diabetes Maternal Grandfather     Heart Disease Maternal Grandfather     Diabetes Paternal Grandfather     No Known Problems Father     No Known Problems Sister     No Known Problems Brother     No Known Problems Maternal Aunt     No Known Problems Maternal Uncle     No Known Problems Paternal Aunt     No Known Problems Paternal Uncle     No Known Problems Other         Social History     Tobacco Use    Smoking status: Never Smoker    Smokeless tobacco: Never Used   Vaping Use    Vaping Use: Never used   Substance Use Topics    Alcohol use: Yes    Drug use: No        Review of Systems    Constitutional: No fevers, chills, night sweats, excessive fatigue or weight loss. Musculoskeletal: No joint pain, swelling or redness. No decreased range of motion. Neurologic: No headache, blurred vision, and no areas of focal weakness or numbness. Normal gait. No sensory problems. Respiratory: No dyspnea on exertion, orthopnea, chest pain, cough or hemoptysis. Cardiovascular: No anginal chest pain, irregular heart beat, tachycardia, palpitations or orthopnea  Integumentary: No chronic rashes, inflammation, ulcerations, pruritus, petechiae, purpura, ecchymoses, or skin changes        Physical Exam    General: Alert, cooperative, no distress  Musculosketal: Left hand - Limited range of motion to the thumb. She can flex and extend at the MP joint. The IP joint can only flex and extend very minimally. No laxity. No mallet deformity. No tenderness to palpation. Neurologic:  CNII-XII intact, Normal strength, sensation, and reflexes throughout    XR Results (most recent):  Results from Appointment encounter on 06/07/22    XR THUMB LT MIN 2 V    Narrative  Routine healing of fracture to the proximal phalanx of the left thumb. Routine healing of avulsion fracture to the distal phalanx of the left thumb. Callus formation with appearance of callus overlying the IP joint space. ASSESSMENT/PLAN:  Below is the assessment and plan developed based on review of pertinent history, physical exam, labs, studies, and medications. Left thumb pain and right shoulder pain.   On 5/1/2022, she fell down 10 steps at home and recalls hyperextending her left thumb hitting her right shoulder and hitting her head. She reports it felt as though the thumb dislocated and reduced. She was evaluated at the emergency department and x-rays of the left thumb and the right shoulder. X-rays of the thumb reveal an avulsion fracture of the distal phalanx and a possible fracture at the proximal phalanx. The shoulder is unremarkable for fracture. She was given a thumb spica wrist splint and ultimately referred to us. She will continue with the thumb spice brace to be worn consistently for comfort and support. Gentle range of motion while out of the brace to bathe. She presents today in follow-up. She has been wearing the thumb spica brace off and on she reports. She continues to experience very little ability to flex the thumb at the IP joint. She does have good opposition, but cannot slide her thumb down the little finger as the IP joint will not flex enough to do so. Clinical exam is consistent with healing avulsion fracture to the left thumb. Given findings of inability to flex at the IP joint, we will obtain an MRI for further evaluation. On images she does have callus formation at the IP joint, so this may be why she is unable to flex the thumb. These images were shown to the patient. She will call a couple of days following the MRI to review the results and establish a plan. In the meantime, she will access a slip-on aluminum splint and transition out of the thumb spica splint for better comfort. Gentle range of motion encouraged. 1. Pain of left thumb  2. Avulsion fracture of left thumb, closed, initial encounter  -     XR THUMB LT MIN 2 V; Future  -     MRI FINGER/HAND JT LT WO CONT; Future  3. Sprain of metacarpophalangeal (MCP) joint of left thumb, subsequent encounter      Return in about 4 weeks (around 7/5/2022). Dr. Leandra Samaniego was available for immediate consult during this encounter. An electronic signature was used to authenticate this note.   -- Janelle Chang JACKSON Weaver

## 2022-07-01 ENCOUNTER — OFFICE VISIT (OUTPATIENT)
Dept: NEUROLOGY | Age: 22
End: 2022-07-01
Payer: COMMERCIAL

## 2022-07-01 VITALS
SYSTOLIC BLOOD PRESSURE: 118 MMHG | HEART RATE: 82 BPM | DIASTOLIC BLOOD PRESSURE: 76 MMHG | OXYGEN SATURATION: 98 % | WEIGHT: 293 LBS | BODY MASS INDEX: 45.99 KG/M2 | HEIGHT: 67 IN

## 2022-07-01 DIAGNOSIS — R29.818 TRANSIENT NEUROLOGICAL SYMPTOMS: ICD-10-CM

## 2022-07-01 DIAGNOSIS — R55 NEAR SYNCOPE: Primary | ICD-10-CM

## 2022-07-01 DIAGNOSIS — G90.9 ANS (AUTONOMIC NERVOUS SYSTEM) DISEASE: ICD-10-CM

## 2022-07-01 PROCEDURE — 99204 OFFICE O/P NEW MOD 45 MIN: CPT | Performed by: PSYCHIATRY & NEUROLOGY

## 2022-07-01 NOTE — PROGRESS NOTES
[facility] and [address] at [facility] at [address] [facility], [address] [facility] [address] [facility], [address] [place] [address]  [phone] Office  [phone]           Referring: Alberto Hendricks MD  [id number]    No chief complaint on file. 20-year-old lady presents today accompanied by her friend for neurologic consultation regarding episodes of loss of consciousness and cold sensation. She notes that she has been to the emergency department several times over the last few weeks. She has the stereotypical episodes where she feels like cold numbness go throughout her body and then she feels like she is going to pass out. She has not lost consciousness completely. 1 of these occurred while she was driving. It lasted about a minute. She was able to maintain control of the car. Another event she was at a friend's house and she sat up and felt like she was going to pass out. She laid back down but then could not get her head to move upward. She could not raise her head. Secondary to this EMS was summoned. [facility] CT scan and released. She had a third event per her friend and also same stereotypical symptoms. She also notes that she has had episodes of markedly elevated heart rate noting that when she was in the emergency department her heart rate went up to 175. She does endorse anxiety. She will be starting Zoloft soon. Question has been raised as to whether this symptomology may be secondary to anxiety or an underlying other type process. Record review finds I saw the patient last March 6, 2019 where she was reporting headache frequent falls and loss of sensation in her legs. She previously saw Dr. Tari Ahn in the past and was [place] [facility]   At that time Dr. Temo Carr had ordered an MRI that was to be performed the day after I saw her. EMG done by one of our neuromuscular experts on March 8 was normal  [facility] the [facility] found no abnormality by report. There is no evidence for colloid cysts or any other type cyst.  CT of the head done most recently April 30, 2022 for headache and a fall down 7 steps is personally reviewed and unremarkable    Cervical plain films 4/30/2022 without fracture  X-ray of the left thumb done the same date with an avulsion fracture of the distal phalanx of the thumb with a small fracture fragment at the IP joint. Questionable fracture cortical irregularity of the proximal phalanx of the thumb    Emergency department note 6/5/2022 where patient came in for question of allergic reaction. She ate a piece of chocolate pie and then started to have some facial itching and lip swelling swelling sensation of her throat. It was noted her heart rate went up to 180 and question of SVT was raised. She was referred to cardiology. She was treated with Benadryl and released    Emergency department visit March 1722 where she presented noting that while she was driving home she had a metallic taste in her mouth and her body went numb and her body ran call with tingling in her left and upper and lower extremities and felt lightheaded. Symptoms had improved but not resolved by the time she was in the emergency department. Examination was unremarkable.   Laboratory analysis March 16, 2022  CBC with hemoglobin 9.3  Metabolic panel unremarkable  Troponin normal  Past Medical History:   Diagnosis Date    Adverse effect of anesthesia     REQUIRES A LOT OF ANESTHESIA PER MOTHER    Anemia     Anxiety     Attention deficit disorder (ADD)     Closed fracture of metatarsal neck 11/11/2015    [facility], 9/15/15 with Dr. Cedeno Left alignment and healing     Conversion disorder     Depression     Hx of seasonal allergies     Hypothyroidism 1/8/2014    Kyphosis     Kyphosis     Obesity     Scoliosis     Scoliosis     Snoring     Vision decreased     Vitamin D deficiency 1/8/2014    Took 12 weeks of weekly 13642 international units of vitamin d and now on 2000 international units  daily          Past Surgical History:   Procedure Laterality Date    HX BACK SURGERY  2009    Thoracic cage with screws    HX KNEE ARTHROSCOPY  01/2017    LEFT KNEE       Current Outpatient Medications   Medication Sig Dispense Refill    cyanocobalamin 1,000 mcg tablet Take 1,000 mcg by mouth daily. (Patient not taking: Reported on 5/3/2022)      Apple Cider Vinegar 300 mg tab Take  by mouth. (Patient not taking: Reported on 5/3/2022)      levothyroxine (SYNTHROID) 25 mcg tablet Take 25 mcg tab with 200 mcg together daily before breakfast (total daily dose of 225 mcg per day) (Patient not taking: Reported on 5/3/2022) 90 Tablet 1    hydrOXYzine HCL (ATARAX) 10 mg tablet Take 1 Tablet by mouth daily as needed for Anxiety. (Patient not taking: Reported on 5/3/2022) 20 Tablet 0    DULoxetine (CYMBALTA) 60 mg capsule Take 1 Capsule by mouth daily. (Patient not taking: Reported on 5/3/2022) 90 Capsule 1    levothyroxine (SYNTHROID) 200 mcg tablet Take 1 Tablet by mouth Daily (before breakfast). (Patient not taking: Reported on 5/3/2022) 90 Tablet 1    FERROUS FUMARATE (IRON PO) Take  by mouth daily. (Patient not taking: Reported on 5/3/2022)          Allergies   Allergen Reactions    Hazelnut Anaphylaxis    Adhesive Tape-Silicones Itching     Paper tape    [place] Swelling    Grass Pollen Unknown (comments)     + testing       Social History     Tobacco Use    Smoking status: Never Smoker    Smokeless tobacco: Never Used   Vaping Use    Vaping Use: Never used   Substance Use Topics    Alcohol use:  Yes    Drug use: No       Family History   Problem Relation Age of Onset    Heart Failure Maternal Grandmother     Hypertension Maternal Grandmother     Asthma Maternal Grandmother     Cancer Paternal Grandmother         lung    OSTEOARTHRITIS Mother         psoriatic    Thyroid Disease Mother         Belita Cage    Diabetes Maternal Grandfather     Heart Disease Maternal Grandfather     Diabetes Paternal Grandfather     No Known Problems Father     No Known Problems Sister     No Known Problems Brother     No Known Problems Maternal Aunt     No Known Problems Maternal Uncle     No Known Problems Paternal Aunt     No Known Problems Paternal Uncle     No Known Problems Other        Review of Systems  Pertinent positives and negatives as noted. Examination  There were no vitals taken for this visit. Neurologically, she is awake, alert, and oriented with normal speech and language. Her cognition is normal.    Intact cranial nerves 2-12. No nystagmus. Disk margins are flat bilaterally. She has normal bulk and tone. She has no abnormal movement. She has no pronation or drift. She generates full strength in the upper and lower extremities to direct confrontational testing. Reflexes are symmetrical in the upper and lower extremities bilaterally. No pathologic reflexes are elicited. Finger nose finger and rapid alternating movements are normal.  Steady gait. Impression/Plan  17-year-old lady with recurrent events of near syncope/stereotypical transient neurologic events and differential diagnosis includes vascular versus ictal versus ANS dysfunction versus anxiety. Secondary to the stereotypical nature of her complaints as well as complaints of markedly elevated heart rate as above we will proceed with ANS testing as well as to get a carotid Doppler and an EEG. If these are unremarkable then no further neurologic evaluation required    Lisa Coates MD          This note was created using voice recognition software. Despite editing, there may be syntax errors.

## 2022-07-05 ENCOUNTER — TELEPHONE (OUTPATIENT)
Dept: NEUROLOGY | Age: 22
End: 2022-07-05

## 2022-07-05 NOTE — TELEPHONE ENCOUNTER
Submitted online for PA ans testing [id number]  Certification Not Required for this Service    Hold prior to testing  cymbalta-3 days

## 2022-07-19 ENCOUNTER — HOSPITAL ENCOUNTER (EMERGENCY)
Age: 22
Discharge: HOME | End: 2022-07-19
Attending: EMERGENCY MEDICINE | Admitting: EMERGENCY MEDICINE
Payer: COMMERCIAL

## 2022-07-19 VITALS
OXYGEN SATURATION: 95 % | BODY MASS INDEX: 45.99 KG/M2 | RESPIRATION RATE: 18 BRPM | TEMPERATURE: 98.7 F | DIASTOLIC BLOOD PRESSURE: 77 MMHG | HEIGHT: 67 IN | WEIGHT: 293 LBS | SYSTOLIC BLOOD PRESSURE: 121 MMHG

## 2022-07-19 DIAGNOSIS — Z20.822 ENCOUNTER FOR LABORATORY TESTING FOR COVID-19 VIRUS: Primary | ICD-10-CM

## 2022-07-19 LAB — SARS-COV-2, COV2: NORMAL

## 2022-07-19 PROCEDURE — 99283 EMERGENCY DEPT VISIT LOW MDM: CPT | Performed by: EMERGENCY MEDICINE

## 2022-07-19 PROCEDURE — U0003 INFECTIOUS AGENT DETECTION BY NUCLEIC ACID (DNA OR RNA); SEVERE ACUTE RESPIRATORY SYNDROME CORONAVIRUS 2 (SARS-COV-2) (CORONAVIRUS DISEASE [COVID-19]), AMPLIFIED PROBE TECHNIQUE, MAKING USE OF HIGH THROUGHPUT TECHNOLOGIES AS DESCRIBED BY CMS-2020-01-R: HCPCS

## 2022-07-19 RX ORDER — SERTRALINE HYDROCHLORIDE 50 MG/1
TABLET, FILM COATED ORAL
COMMUNITY
Start: 2022-06-24

## 2022-07-19 RX ORDER — MULTIVITAMIN WITH IRON
1 TABLET ORAL DAILY
COMMUNITY

## 2022-07-19 RX ORDER — LEVOTHYROXINE SODIUM 175 UG/1
175 CAPSULE ORAL
COMMUNITY

## 2022-07-19 RX ORDER — ERGOCALCIFEROL 1.25 MG/1
CAPSULE ORAL
COMMUNITY

## 2022-07-20 LAB
SARS-COV-2, XPLCVT: DETECTED
SOURCE, COVRS: ABNORMAL

## 2022-07-20 NOTE — ED PROVIDER NOTES
Patient is a 23-year-old who is vaccinated against COVID and comes into the emergency department after having 2 positive COVID-19 test at home. She has had some mild cough and congestion and she feels short of breath when she coughs. She has not had any fevers. She is requesting confirmatory COVID test because she will need to leave her home if she is positive for COVID as she lives with people who are high risk.       Positive For Covid-19  Associated symptoms: cough and sore throat         Past Medical History:   Diagnosis Date    Adverse effect of anesthesia     REQUIRES A LOT OF ANESTHESIA PER MOTHER    Anemia     Anxiety     Attention deficit disorder (ADD)     Closed fracture of metatarsal neck 11/11/2015    [facility], 9/15/15 with Dr. Yovani Comer alignment and healing     Conversion disorder     Depression     Hx of seasonal allergies     Hypothyroidism 1/8/2014    Kyphosis     Kyphosis     Obesity     Scoliosis     Scoliosis     Snoring     Vision decreased     Vitamin D deficiency 1/8/2014    Took 12 weeks of weekly 96501 international units of vitamin d and now on 2000 international units  daily          Past Surgical History:   Procedure Laterality Date    HX BACK SURGERY  2009    Thoracic cage with screws    HX KNEE ARTHROSCOPY  01/2017    LEFT KNEE         Family History:   Problem Relation Age of Onset    Heart Failure Maternal Grandmother     Hypertension Maternal Grandmother     Asthma Maternal Grandmother     Cancer Paternal Grandmother         lung    OSTEOARTHRITIS Mother         psoriatic    Thyroid Disease Mother         Graves    Diabetes Maternal Grandfather     Heart Disease Maternal Grandfather     Diabetes Paternal Grandfather     No Known Problems Father     No Known Problems Sister     No Known Problems Brother     No Known Problems Maternal Aunt     No Known Problems Maternal Uncle     No Known Problems Paternal Aunt     No Known Problems Paternal Uncle     No Known Problems Other        Social History     Socioeconomic History    Marital status: SINGLE     Spouse name: Not on file    Number of children: Not on file    Years of education: Not on file    Highest education level: Not on file   Occupational History    Not on file   Tobacco Use    Smoking status: Never Smoker    Smokeless tobacco: Never Used   Vaping Use    Vaping Use: Never used   Substance and Sexual Activity    Alcohol use: Yes    Drug use: No    Sexual activity: Yes     Partners: Male     Birth control/protection: Inserts   Other Topics Concern    Not on file   Social History Narrative    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    Has several cats    Lives with mom     Social Determinants of Health     Financial Resource Strain:     Difficulty of Paying Living Expenses: Not on file   Food Insecurity:     Worried About Running Out of Food in the Last Year: Not on file    Charline of Food in the Last Year: Not on file   Transportation Needs:     Lack of Transportation (Medical): Not on file    Lack of Transportation (Non-Medical):  Not on file   Physical Activity:     Days of Exercise per Week: Not on file    Minutes of Exercise per Session: Not on file   Stress:     Feeling of Stress : Not on file   Social Connections:     Frequency of Communication with Friends and Family: Not on file    Frequency of Social Gatherings with Friends and Family: Not on file    Attends Hinduism Services: Not on file    Active Member of Clubs or Organizations: Not on file    Attends Club or Organization Meetings: Not on file    Marital Status: Not on file   Intimate Partner Violence:     Fear of Current or Ex-Partner: Not on file    Emotionally Abused: Not on file    Physically Abused: Not on file    Sexually Abused: Not on file   Housing Stability:     Unable to Pay for Housing in the Last Year: Not on file    Number of [place] in the Last Year: Not on file    Unstable Housing in the Last Year: Not on file         ALLERGIES: Hazelnut, Adhesive tape-silicones, Lynx, and Grass pollen    Review of Systems   HENT: Positive for sore throat. Respiratory: Positive for cough and shortness of breath. All other systems reviewed and are negative. Vitals:    07/19/22 2113   BP: 121/77   Resp: 18   Temp: 98.7 °F (37.1 °C)   SpO2: 95%   Weight: 140.4 kg (309 lb 8.4 oz)   Height: 5' 7\" (1.702 m)            Physical Exam  Vitals and nursing note reviewed. Constitutional:       Appearance: She is well-developed. HENT:      Head: Normocephalic and atraumatic. Eyes:      Pupils: Pupils are equal, round, and reactive to light. Cardiovascular:      Rate and Rhythm: Normal rate and regular rhythm. Pulmonary:      Effort: Pulmonary effort is normal.      Breath sounds: Normal breath sounds. Abdominal:      General: There is no distension. Palpations: Abdomen is soft. Tenderness: There is no abdominal tenderness. Musculoskeletal:      Cervical back: Normal range of motion and neck supple. Skin:     General: Skin is warm and dry. Capillary Refill: Capillary refill takes less than 2 seconds. Neurological:      Mental Status: She is alert and oriented to person, place, and time. Psychiatric:         Mood and Affect: Mood normal. Affect is tearful. Behavior: Behavior normal.          MDM       Procedures    DDx includes COVID-19 infection, respiratory failure, hypoxemia, bronchitis, pneumonia, sepsis. Patient and/or family verbally conveyed their understanding and agreement of the patient's signs, symptoms, diagnosis, treatment and prognosis and additionally agree to follow up as recommended in the discharge instructions or to return to the Emergency Room should their condition change prior to their follow-up appointment. The patient/family verbally agrees with the care-plan and verbally conveys that all of their questions have been answered.  The discharge instructions have also been provided to the patient and/or family with some educational information regarding the patient's diagnosis as well a list of reasons why the patient would want to return to the ER prior to their follow-up appointment, should their condition change. IMPRESSION:  1.  Encounter for laboratory testing for COVID-19 virus

## 2022-07-20 NOTE — ED TRIAGE NOTES
Pt reports cough and congestion since yesterday, today runny nose, dripping in the back of throat. Did 2 home tests for COVID 19 today but wants confirmation.

## 2022-08-12 ENCOUNTER — OFFICE VISIT (OUTPATIENT)
Dept: NEUROLOGY | Age: 22
End: 2022-08-12
Payer: COMMERCIAL

## 2022-08-12 DIAGNOSIS — R55 NEAR SYNCOPE: Primary | ICD-10-CM

## 2022-08-12 PROCEDURE — 95923 AUTONOMIC NRV SYST FUNJ TEST: CPT | Performed by: PSYCHIATRY & NEUROLOGY

## 2022-08-12 PROCEDURE — 95924 ANS PARASYMP & SYMP W/TILT: CPT | Performed by: PSYCHIATRY & NEUROLOGY

## 2022-08-12 NOTE — PROCEDURES
[address] [facility]  [address] [place] [address], [address] [place], [address] [facility]  Phone: [phone]  FAX: [phone]     Clinical Autonomic Testing Report     Patient ID:  Sophie Olmstead  [id number]  23 y.o.  2000     REFERRED BY: Joslyn Streeter MD  PCP: Rosalie Paredes MD    Date of Testin2022      Indication/History:     Episodes of body getting cold and numb and passing out 1 time. (+) anxiety    Patient is coming for syncope/autonomic dysfunction evaluation. Medications taken 48 hrs before the test: None     Procedure: This Autonomic Nervous System (ANS) testing is performed by utilizing [address] [place] [address] Clinical Data Autonomic System, with established protocol. Multiple procedures performed: Heart rate response to deep breathing (HRDB), Valsalva ratio, Heart rate and BP response to head up tilt (HUT), and Quantitative sudomotor axon reflex testing (QSWEAT) . Result:  Heart response to deep  breathing (HRDB): 2 series of 6 cycles were performed and the mean of 6 consecutive cycles was obtained. Average HR difference was 14.61 and E:I ratio was 1.24. Normal response. Heart rate response to Valsalva maneuver:  [id number] BP to Valsalva was measured and BP response in all 4 phases was normal. Heart response was the opposite of BP, a normal response. ( VR = 1.58 )  HUT (head-up tilt) : [id number] BP and HR were measured, up to 15 minutes post tilt. No significant BP reduction or HR acceleration/deceleration. SUDOMOTOR: QSWEAT response showed relatively preserved sweat production in all 4 localities (forearm, proximal leg, distal leg, foot) of the right upper and lower limbs, comparing patient to age group. Impression:   NORMAL    Relatively preserved autonomic function for this age.          Erica Crawford MD  Diplomate, American Board of Psychiatry and Neurology  Diplomate, Neuromuscular Medicine  Diplomate, American Board of Electrodiagnostic Medicine    Note: Raw Data will be scanned separately in Media

## 2023-02-23 ENCOUNTER — HOSPITAL ENCOUNTER (EMERGENCY)
Age: 23
Discharge: HOME | End: 2023-02-23
Attending: EMERGENCY MEDICINE
Payer: COMMERCIAL

## 2023-02-23 ENCOUNTER — APPOINTMENT (OUTPATIENT)
Dept: GENERAL RADIOLOGY | Age: 23
End: 2023-02-23
Attending: EMERGENCY MEDICINE
Payer: COMMERCIAL

## 2023-02-23 ENCOUNTER — APPOINTMENT (OUTPATIENT)
Dept: CT IMAGING | Age: 23
End: 2023-02-23
Attending: EMERGENCY MEDICINE
Payer: COMMERCIAL

## 2023-02-23 VITALS
HEART RATE: 84 BPM | SYSTOLIC BLOOD PRESSURE: 92 MMHG | DIASTOLIC BLOOD PRESSURE: 62 MMHG | TEMPERATURE: 97 F | OXYGEN SATURATION: 98 % | RESPIRATION RATE: 16 BRPM

## 2023-02-23 DIAGNOSIS — S89.92XA INJURY OF LEFT KNEE, INITIAL ENCOUNTER: ICD-10-CM
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
## 2023-02-23 LAB
ALBUMIN SERPL-MCNC: 4.6 G/DL (ref 3.5–5)
ALBUMIN/GLOB SERPL: 1.2 (ref 1.1–2.2)
ALP SERPL-CCNC: 70 U/L (ref 45–117)
ALT SERPL-CCNC: 15 U/L (ref 12–78)
ANION GAP SERPL CALC-SCNC: 4 MMOL/L (ref 5–15)
AST SERPL-CCNC: 19 U/L (ref 15–37)
BASOPHILS # BLD: 0.1 K/UL (ref 0–0.1)
BASOPHILS NFR BLD: 1 % (ref 0–1)
BILIRUB SERPL-MCNC: 0.7 MG/DL (ref 0.2–1)
BUN SERPL-MCNC: 17 MG/DL (ref 6–20)
BUN/CREAT SERPL: 21 (ref 12–20)
CALCIUM SERPL-MCNC: 9.6 MG/DL (ref 8.5–10.1)
CHLORIDE SERPL-SCNC: 105 MMOL/L (ref 97–108)
CO2 SERPL-SCNC: 27 MMOL/L (ref 21–32)
CREAT SERPL-MCNC: 0.8 MG/DL (ref 0.55–1.02)
DIFFERENTIAL METHOD BLD: ABNORMAL
EOSINOPHIL # BLD: 0.1 K/UL (ref 0–0.4)
EOSINOPHIL NFR BLD: 1 % (ref 0–7)
ERYTHROCYTE [DISTWIDTH] IN BLOOD BY AUTOMATED COUNT: 15 % (ref 11.5–14.5)
GLOBULIN SER CALC-MCNC: 3.7 G/DL (ref 2–4)
GLUCOSE SERPL-MCNC: 88 MG/DL (ref 65–100)
HCG UR QL: NEGATIVE
HCT VFR BLD AUTO: 38 % (ref 35–47)
HGB BLD-MCNC: 12.3 G/DL (ref 11.5–16)
IMM GRANULOCYTES # BLD AUTO: 0 K/UL (ref 0–0.04)
IMM GRANULOCYTES NFR BLD AUTO: 0 % (ref 0–0.5)
LIPASE SERPL-CCNC: 71 U/L (ref 73–393)
LYMPHOCYTES # BLD: 1.4 K/UL (ref 0.8–3.5)
LYMPHOCYTES NFR BLD: 16 % (ref 12–49)
MCH RBC QN AUTO: 27.7 PG (ref 26–34)
MCHC RBC AUTO-ENTMCNC: 32.4 G/DL (ref 30–36.5)
MCV RBC AUTO: 85.6 FL (ref 80–99)
MONOCYTES # BLD: 0.4 K/UL (ref 0–1)
MONOCYTES NFR BLD: 5 % (ref 5–13)
NEUTS SEG # BLD: 6.7 K/UL (ref 1.8–8)
NEUTS SEG NFR BLD: 77 % (ref 32–75)
NRBC # BLD: 0 K/UL (ref 0–0.01)
NRBC BLD-RTO: 0 PER 100 WBC
PLATELET # BLD AUTO: 216 K/UL (ref 150–400)
PMV BLD AUTO: 10.7 FL (ref 8.9–12.9)
POTASSIUM SERPL-SCNC: 4.1 MMOL/L (ref 3.5–5.1)
PROT SERPL-MCNC: 8.3 G/DL (ref 6.4–8.2)
RBC # BLD AUTO: 4.44 M/UL (ref 3.8–5.2)
SODIUM SERPL-SCNC: 136 MMOL/L (ref 136–145)
WBC # BLD AUTO: 8.7 K/UL (ref 3.6–11)

## 2023-02-23 PROCEDURE — 36415 COLL VENOUS BLD VENIPUNCTURE: CPT

## 2023-02-23 PROCEDURE — 73562 X-RAY EXAM OF KNEE 3: CPT

## 2023-02-23 PROCEDURE — 99285 EMERGENCY DEPT VISIT HI MDM: CPT

## 2023-02-23 PROCEDURE — 85025 COMPLETE CBC W/AUTO DIFF WBC: CPT

## 2023-02-23 PROCEDURE — 74011000636 HC RX REV CODE- 636: Performed by: RADIOLOGY

## 2023-02-23 PROCEDURE — 83690 ASSAY OF LIPASE: CPT

## 2023-02-23 PROCEDURE — 70450 CT HEAD/BRAIN W/O DYE: CPT

## 2023-02-23 PROCEDURE — 80053 COMPREHEN METABOLIC PANEL: CPT

## 2023-02-23 PROCEDURE — 81025 URINE PREGNANCY TEST: CPT

## 2023-02-23 PROCEDURE — 71260 CT THORAX DX C+: CPT

## 2023-02-23 RX ORDER — METHOCARBAMOL 750 MG/1
750 TABLET, FILM COATED ORAL 4 TIMES DAILY
Qty: 20 TABLET | Refills: 0 | Status: SHIPPED | OUTPATIENT
Start: 2023-02-23 | End: 2023-02-28

## 2023-02-23 RX ORDER — NAPROXEN 500 MG/1
500 TABLET ORAL 2 TIMES DAILY WITH MEALS
Qty: 14 TABLET | Refills: 0 | Status: SHIPPED | OUTPATIENT
Start: 2023-02-23 | End: 2023-03-02

## 2023-02-23 RX ADMIN — IOPAMIDOL 100 ML: 755 INJECTION, SOLUTION INTRAVENOUS at 20:46
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
## 2023-07-25 ENCOUNTER — HOSPITAL ENCOUNTER (EMERGENCY)
Facility: HOSPITAL | Age: 23
Discharge: HOME | End: 2023-07-25
Attending: EMERGENCY MEDICINE
Payer: COMMERCIAL

## 2023-07-25 ENCOUNTER — APPOINTMENT (OUTPATIENT)
Facility: HOSPITAL | Age: 23
End: 2023-07-25
Payer: COMMERCIAL

## 2023-07-25 VITALS
HEART RATE: 83 BPM | DIASTOLIC BLOOD PRESSURE: 70 MMHG | HEIGHT: 68 IN | TEMPERATURE: 98.2 F | RESPIRATION RATE: 20 BRPM | OXYGEN SATURATION: 98 % | WEIGHT: 288.36 LBS | BODY MASS INDEX: 43.7 KG/M2 | SYSTOLIC BLOOD PRESSURE: 122 MMHG

## 2023-07-25 DIAGNOSIS — G43.809 OTHER MIGRAINE WITHOUT STATUS MIGRAINOSUS, NOT INTRACTABLE: Primary | ICD-10-CM

## 2023-07-25 LAB
ALBUMIN SERPL-MCNC: 4.1 G/DL (ref 3.5–5)
ALBUMIN/GLOB SERPL: 1.1 (ref 1.1–2.2)
ALP SERPL-CCNC: 68 U/L (ref 45–117)
ALT SERPL-CCNC: 14 U/L (ref 12–78)
ANION GAP SERPL CALC-SCNC: 11 MMOL/L (ref 5–15)
AST SERPL-CCNC: 16 U/L (ref 15–37)
BASOPHILS # BLD: 0 K/UL (ref 0–0.1)
BASOPHILS NFR BLD: 1 % (ref 0–1)
BILIRUB SERPL-MCNC: 0.6 MG/DL (ref 0.2–1)
BUN SERPL-MCNC: 12 MG/DL (ref 6–20)
BUN/CREAT SERPL: 18 (ref 12–20)
CALCIUM SERPL-MCNC: 8.9 MG/DL (ref 8.5–10.1)
CHLORIDE SERPL-SCNC: 100 MMOL/L (ref 97–108)
CO2 SERPL-SCNC: 26 MMOL/L (ref 21–32)
CREAT SERPL-MCNC: 0.67 MG/DL (ref 0.55–1.02)
DIFFERENTIAL METHOD BLD: NORMAL
EOSINOPHIL # BLD: 0.1 K/UL (ref 0–0.4)
EOSINOPHIL NFR BLD: 2 % (ref 0–7)
ERYTHROCYTE [DISTWIDTH] IN BLOOD BY AUTOMATED COUNT: 14.3 % (ref 11.5–14.5)
GLOBULIN SER CALC-MCNC: 3.9 G/DL (ref 2–4)
GLUCOSE SERPL-MCNC: 101 MG/DL (ref 65–100)
HCT VFR BLD AUTO: 36.2 % (ref 35–47)
HGB BLD-MCNC: 11.9 G/DL (ref 11.5–16)
IMM GRANULOCYTES # BLD AUTO: 0 K/UL (ref 0–0.04)
IMM GRANULOCYTES NFR BLD AUTO: 0 % (ref 0–0.5)
LYMPHOCYTES # BLD: 1.1 K/UL (ref 0.8–3.5)
LYMPHOCYTES NFR BLD: 16 % (ref 12–49)
MCH RBC QN AUTO: 27.6 PG (ref 26–34)
MCHC RBC AUTO-ENTMCNC: 32.9 G/DL (ref 30–36.5)
MCV RBC AUTO: 84 FL (ref 80–99)
MONOCYTES # BLD: 0.5 K/UL (ref 0–1)
MONOCYTES NFR BLD: 7 % (ref 5–13)
NEUTS SEG # BLD: 4.9 K/UL (ref 1.8–8)
NEUTS SEG NFR BLD: 74 % (ref 32–75)
NRBC # BLD: 0 K/UL (ref 0–0.01)
NRBC BLD-RTO: 0 PER 100 WBC
PLATELET # BLD AUTO: 175 K/UL (ref 150–400)
PMV BLD AUTO: 10.3 FL (ref 8.9–12.9)
POTASSIUM SERPL-SCNC: 3.7 MMOL/L (ref 3.5–5.1)
PROT SERPL-MCNC: 8 G/DL (ref 6.4–8.2)
RBC # BLD AUTO: 4.31 M/UL (ref 3.8–5.2)
SODIUM SERPL-SCNC: 137 MMOL/L (ref 136–145)
WBC # BLD AUTO: 6.5 K/UL (ref 3.6–11)

## 2023-07-25 PROCEDURE — 96374 THER/PROPH/DIAG INJ IV PUSH: CPT

## 2023-07-25 PROCEDURE — 80053 COMPREHEN METABOLIC PANEL: CPT

## 2023-07-25 PROCEDURE — 6360000002 HC RX W HCPCS: Performed by: EMERGENCY MEDICINE

## 2023-07-25 PROCEDURE — 96375 TX/PRO/DX INJ NEW DRUG ADDON: CPT

## 2023-07-25 PROCEDURE — 70450 CT HEAD/BRAIN W/O DYE: CPT

## 2023-07-25 PROCEDURE — 85025 COMPLETE CBC W/AUTO DIFF WBC: CPT

## 2023-07-25 PROCEDURE — 2580000003 HC RX 258: Performed by: EMERGENCY MEDICINE

## 2023-07-25 PROCEDURE — 99284 EMERGENCY DEPT VISIT MOD MDM: CPT

## 2023-07-25 PROCEDURE — 96361 HYDRATE IV INFUSION ADD-ON: CPT

## 2023-07-25 PROCEDURE — 36415 COLL VENOUS BLD VENIPUNCTURE: CPT

## 2023-07-25 RX ORDER — PROCHLORPERAZINE EDISYLATE 5 MG/ML
5 INJECTION INTRAMUSCULAR; INTRAVENOUS ONCE
Status: COMPLETED | OUTPATIENT
Start: 2023-07-25 | End: 2023-07-25

## 2023-07-25 RX ORDER — ONDANSETRON 2 MG/ML
4 INJECTION INTRAMUSCULAR; INTRAVENOUS
Status: COMPLETED | OUTPATIENT
Start: 2023-07-25 | End: 2023-07-25

## 2023-07-25 RX ORDER — SODIUM CHLORIDE 9 MG/ML
INJECTION, SOLUTION INTRAVENOUS ONCE
Status: COMPLETED | OUTPATIENT
Start: 2023-07-25 | End: 2023-07-25

## 2023-07-25 RX ORDER — KETOROLAC TROMETHAMINE 30 MG/ML
30 INJECTION, SOLUTION INTRAMUSCULAR; INTRAVENOUS
Status: COMPLETED | OUTPATIENT
Start: 2023-07-25 | End: 2023-07-25

## 2023-07-25 RX ADMIN — SODIUM CHLORIDE: 9 INJECTION, SOLUTION INTRAVENOUS at 07:57

## 2023-07-25 RX ADMIN — ONDANSETRON 4 MG: 2 INJECTION INTRAMUSCULAR; INTRAVENOUS at 07:54

## 2023-07-25 RX ADMIN — PROCHLORPERAZINE EDISYLATE 5 MG: 5 INJECTION INTRAMUSCULAR; INTRAVENOUS at 08:13

## 2023-07-25 RX ADMIN — KETOROLAC TROMETHAMINE 30 MG: 30 INJECTION, SOLUTION INTRAMUSCULAR; INTRAVENOUS at 07:59

## 2023-07-25 ASSESSMENT — PAIN SCALES - GENERAL: PAINLEVEL_OUTOF10: 10

## 2023-07-25 NOTE — ED TRIAGE NOTES
Patient arrives ambulatory to the ED with complaints of migraine headache and vomiting. States it started last night. Took excederin around 10pm last night.

## 2023-09-07 ENCOUNTER — TELEPHONE (OUTPATIENT)
Age: 23
End: 2023-09-07

## 2023-09-07 NOTE — TELEPHONE ENCOUNTER
----- Message from Kathy Files sent at 9/7/2023  8:32 AM EDT -----  Subject: Message to Provider    QUESTIONS  Information for Provider? Pt needs a letter excusing her from jury duty   scheduled 9/11-11/10/23, pls fax to [address]  [phone]   ---------------------------------------------------------------------------  --------------  [address]  [phone]; OK to leave message on voicemail  ---------------------------------------------------------------------------  --------------  SCRIPT ANSWERS  Relationship to Patient?  Self

## 2023-09-07 NOTE — TELEPHONE ENCOUNTER
Chief Complaint   Patient presents with    Other     Work note      Please call patient and Schedule office visit.

## 2023-09-07 NOTE — TELEPHONE ENCOUNTER
Left detailed message informing the pt that she will need an In Office Appt with . To have her paperwork excusing her from [address] completed,when pt calls back please get her scheduled.

## 2023-09-14 ENCOUNTER — TELEMEDICINE (OUTPATIENT)
Age: 23
End: 2023-09-14
Payer: COMMERCIAL

## 2023-09-14 DIAGNOSIS — F32.A ANXIETY AND DEPRESSION: Primary | ICD-10-CM

## 2023-09-14 DIAGNOSIS — E03.9 HYPOTHYROIDISM, UNSPECIFIED TYPE: ICD-10-CM

## 2023-09-14 DIAGNOSIS — F41.9 ANXIETY AND DEPRESSION: Primary | ICD-10-CM

## 2023-09-14 PROCEDURE — 99214 OFFICE O/P EST MOD 30 MIN: CPT | Performed by: FAMILY MEDICINE

## 2023-09-14 RX ORDER — LEVOTHYROXINE SODIUM 0.2 MG/1
TABLET ORAL
Qty: 90 TABLET | Refills: 1 | Status: SHIPPED | OUTPATIENT
Start: 2023-09-14

## 2023-09-14 RX ORDER — LEVOTHYROXINE SODIUM 0.03 MG/1
TABLET ORAL
Qty: 90 TABLET | Refills: 1 | Status: SHIPPED | OUTPATIENT
Start: 2023-09-14

## 2023-09-14 RX ORDER — DULOXETIN HYDROCHLORIDE 30 MG/1
30 CAPSULE, DELAYED RELEASE ORAL DAILY
Qty: 30 CAPSULE | Refills: 2 | Status: SHIPPED | OUTPATIENT
Start: 2023-09-14

## 2023-09-14 ASSESSMENT — ENCOUNTER SYMPTOMS
SHORTNESS OF BREATH: 0
COUGH: 0
VOMITING: 0
ABDOMINAL PAIN: 0
DIARRHEA: 0
CONSTIPATION: 0
WHEEZING: 0
CHEST TIGHTNESS: 0
NAUSEA: 0

## 2023-09-14 NOTE — PROGRESS NOTES
mouth daily, Disp: , Rfl:     hydrOXYzine HCl (ATARAX) 10 MG tablet, Take 1 tablet by mouth daily as needed, Disp: , Rfl:     Allergies   Allergen Reactions    Corylus Anaphylaxis    Adhesive Tape Itching     Paper tape    [place]aca Oil     Grass Pollen(K-O-R-T-Swt Gurjit)      Other reaction(s): Unknown (comments)  + testing    Hazelnut (Filbert) Allergy Skin Test     Macadamia Nut Oil Swelling       Review of Systems   Constitutional:  Negative for activity change, appetite change, fatigue, fever and unexpected weight change. Respiratory:  Negative for cough, chest tightness, shortness of breath and wheezing. Cardiovascular:  Negative for chest pain, palpitations and leg swelling. Gastrointestinal:  Negative for abdominal pain, constipation, diarrhea, nausea and vomiting. Genitourinary:  Negative for dysuria, frequency and urgency. Skin:  Negative for rash. Neurological:  Negative for dizziness, weakness and headaches. Psychiatric/Behavioral:  Positive for dysphoric mood. Negative for self-injury and suicidal ideas. The patient is nervous/anxious. All other systems reviewed and are negative.       Objective:          No data to display                 Constitutional: [x] Appears well-developed and well-nourished [x] No apparent distress      [] Abnormal -     Mental status: [x] Alert and awake  [x] Oriented to person/place/time [x] Able to follow commands    [] Abnormal -     Eyes:   EOM    [x]  Normal    [] Abnormal -   Sclera  [x]  Normal    [] Abnormal -          Discharge []  None visible   [] Abnormal -     HENT: [x] Normocephalic, atraumatic  [] Abnormal -   [] Mouth/Throat: Mucous membranes are moist    Neck: [x] No visualized mass [] Abnormal -     Pulmonary/Chest: [x] Respiratory effort normal   [x] No visualized signs of difficulty breathing or respiratory distress        [] Abnormal -         Skin:        [x] No significant exanthematous lesions or discoloration noted on facial skin

## 2024-02-18 ENCOUNTER — HOSPITAL ENCOUNTER (EMERGENCY)
Facility: HOSPITAL | Age: 24
Discharge: HOME | End: 2024-02-18
Attending: EMERGENCY MEDICINE
Payer: COMMERCIAL

## 2024-02-18 VITALS
TEMPERATURE: 98.3 F | BODY MASS INDEX: 42.77 KG/M2 | DIASTOLIC BLOOD PRESSURE: 82 MMHG | RESPIRATION RATE: 20 BRPM | WEIGHT: 282.19 LBS | HEART RATE: 79 BPM | SYSTOLIC BLOOD PRESSURE: 130 MMHG | HEIGHT: 68 IN | OXYGEN SATURATION: 98 %

## 2024-02-18 DIAGNOSIS — S01.531A INFECTED PIERCED LIP: Primary | ICD-10-CM

## 2024-02-18 DIAGNOSIS — L08.9 INFECTED PIERCED LIP: Primary | ICD-10-CM

## 2024-02-18 PROCEDURE — 99282 EMERGENCY DEPT VISIT SF MDM: CPT

## 2024-02-18 PROCEDURE — 2500000003 HC RX 250 WO HCPCS: Performed by: EMERGENCY MEDICINE

## 2024-02-18 RX ORDER — LIDOCAINE HYDROCHLORIDE 10 MG/ML
5 INJECTION, SOLUTION EPIDURAL; INFILTRATION; INTRACAUDAL; PERINEURAL
Status: COMPLETED | OUTPATIENT
Start: 2024-02-18 | End: 2024-02-18

## 2024-02-18 RX ADMIN — LIDOCAINE HYDROCHLORIDE 5 ML: 10 INJECTION, SOLUTION EPIDURAL; INFILTRATION; INTRACAUDAL; PERINEURAL at 16:00

## 2024-02-18 ASSESSMENT — PAIN - FUNCTIONAL ASSESSMENT
PAIN_FUNCTIONAL_ASSESSMENT: 0-10
PAIN_FUNCTIONAL_ASSESSMENT: ACTIVITIES ARE NOT PREVENTED

## 2024-02-18 ASSESSMENT — PAIN DESCRIPTION - DESCRIPTORS: DESCRIPTORS: ACHING

## 2024-02-18 ASSESSMENT — PAIN DESCRIPTION - PAIN TYPE: TYPE: ACUTE PAIN

## 2024-02-18 ASSESSMENT — PAIN DESCRIPTION - FREQUENCY: FREQUENCY: CONTINUOUS

## 2024-02-18 ASSESSMENT — PAIN DESCRIPTION - ONSET: ONSET: ON-GOING

## 2024-02-18 ASSESSMENT — PAIN DESCRIPTION - LOCATION: LOCATION: MOUTH

## 2024-02-18 ASSESSMENT — PAIN DESCRIPTION - ORIENTATION: ORIENTATION: LEFT

## 2024-02-18 ASSESSMENT — PAIN SCALES - GENERAL: PAINLEVEL_OUTOF10: 4

## 2024-02-18 NOTE — DISCHARGE INSTRUCTIONS
Continue warm water gargles for the next few days until the inside of your lip heals.  Avoid any hard foods or anything too spicy or acidic as this can irritate the area.  Initially course of antibiotics.  If you choose to repierce the area you should wait several months.

## 2024-02-18 NOTE — ED PROVIDER NOTES
[facility] EMERGENCY DEP  EMERGENCY DEPARTMENT ENCOUNTER      Pt Name: Tori Lipscomb  MRN: [id number]  Birthdate 2000  Date of evaluation: 2/18/2024  Provider: DEVANTE Payton    CHIEF COMPLAINT     No chief complaint on file.        HISTORY OF PRESENT ILLNESS   (Location/Symptom, Timing/Onset, Context/Setting, Quality, Duration, Modifying Factors, Severity)  Note limiting factors.   Tori Lipscomb is a 23 y.o. female with past medical history as listed below who presents to the emergency department for evaluation of infected lip piercing to her left lower lip.  She had the piercing placed 3 to 4 weeks ago.  States that 4 days ago the area started to get very painful and swollen, she was seen at an urgent care and was started on Augmentin.  She did not remove the piercing at that time.  Feels like the swelling has significantly improved but now she is unable to remove the piercing because she feels like the mucosa inside of her lip has grown over the backing.      Nursing Notes were reviewed.    REVIEW OF SYSTEMS    (2-9 systems for level 4, 10 or more for level 5)     Review of Systems   Constitutional:  Negative for fever.   HENT:  Negative for congestion and sore throat.    Eyes:  Negative for visual disturbance.   Respiratory:  Negative for cough and shortness of breath.    Cardiovascular:  Negative for chest pain.   Gastrointestinal:  Negative for abdominal pain, diarrhea and vomiting.   Genitourinary:  Negative for dysuria.   Musculoskeletal:  Negative for back pain and neck pain.   Skin:  Positive for wound. Negative for color change.   Neurological:  Negative for dizziness and headaches.   Psychiatric/Behavioral:  Negative for confusion.        Except as noted above the remainder of the review of systems was reviewed and negative.       PAST MEDICAL HISTORY     Past Medical History:   Diagnosis Date    Adverse effect of anesthesia     REQUIRES A LOT OF ANESTHESIA PER MOTHER    Anemia     Anxiety

## 2024-02-18 NOTE — ED TRIAGE NOTES
Patient is on antiobiotics for lip piercing. Decreased swelling but unable to push the piercing through.

## 2024-04-03 ENCOUNTER — APPOINTMENT (OUTPATIENT)
Facility: HOSPITAL | Age: 24
End: 2024-04-03
Payer: COMMERCIAL

## 2024-04-03 ENCOUNTER — HOSPITAL ENCOUNTER (EMERGENCY)
Facility: HOSPITAL | Age: 24
Discharge: HOME | End: 2024-04-04
Attending: EMERGENCY MEDICINE
Payer: COMMERCIAL

## 2024-04-03 VITALS
WEIGHT: 285.94 LBS | HEART RATE: 83 BPM | SYSTOLIC BLOOD PRESSURE: 115 MMHG | OXYGEN SATURATION: 97 % | BODY MASS INDEX: 43.34 KG/M2 | TEMPERATURE: 98.2 F | HEIGHT: 68 IN | DIASTOLIC BLOOD PRESSURE: 74 MMHG | RESPIRATION RATE: 18 BRPM

## 2024-04-03 DIAGNOSIS — L03.90 CELLULITIS, UNSPECIFIED CELLULITIS SITE: ICD-10-CM

## 2024-04-03 DIAGNOSIS — M25.519 ACUTE SHOULDER PAIN, UNSPECIFIED LATERALITY: Primary | ICD-10-CM

## 2024-04-03 PROCEDURE — 99284 EMERGENCY DEPT VISIT MOD MDM: CPT

## 2024-04-03 PROCEDURE — 6370000000 HC RX 637 (ALT 250 FOR IP): Performed by: EMERGENCY MEDICINE

## 2024-04-03 PROCEDURE — 72128 CT CHEST SPINE W/O DYE: CPT

## 2024-04-03 RX ORDER — METHOCARBAMOL 750 MG/1
750 TABLET, FILM COATED ORAL 3 TIMES DAILY PRN
Qty: 15 TABLET | Refills: 0 | Status: SHIPPED | OUTPATIENT
Start: 2024-04-03 | End: 2024-04-04

## 2024-04-03 RX ORDER — AMOXICILLIN AND CLAVULANATE POTASSIUM 875; 125 MG/1; MG/1
1 TABLET, FILM COATED ORAL
Status: COMPLETED | OUTPATIENT
Start: 2024-04-03 | End: 2024-04-03

## 2024-04-03 RX ORDER — METHOCARBAMOL 500 MG/1
750 TABLET, FILM COATED ORAL ONCE
Status: COMPLETED | OUTPATIENT
Start: 2024-04-03 | End: 2024-04-03

## 2024-04-03 RX ORDER — LIDOCAINE 4 G/G
1 PATCH TOPICAL
Status: DISCONTINUED | OUTPATIENT
Start: 2024-04-03 | End: 2024-04-04 | Stop reason: HOSPADM

## 2024-04-03 RX ORDER — AMOXICILLIN AND CLAVULANATE POTASSIUM 875; 125 MG/1; MG/1
1 TABLET, FILM COATED ORAL 2 TIMES DAILY
Qty: 14 TABLET | Refills: 0 | Status: SHIPPED | OUTPATIENT
Start: 2024-04-03 | End: 2024-04-04

## 2024-04-03 RX ADMIN — AMOXICILLIN AND CLAVULANATE POTASSIUM 1 TABLET: 875; 125 TABLET, FILM COATED ORAL at 22:57

## 2024-04-03 RX ADMIN — METHOCARBAMOL 750 MG: 500 TABLET ORAL at 22:57

## 2024-04-03 ASSESSMENT — PAIN SCALES - GENERAL
PAINLEVEL_OUTOF10: 8
PAINLEVEL_OUTOF10: 8

## 2024-04-03 ASSESSMENT — PAIN DESCRIPTION - DESCRIPTORS
DESCRIPTORS: STABBING;SHOOTING
DESCRIPTORS: SHOOTING;STABBING

## 2024-04-03 ASSESSMENT — PAIN DESCRIPTION - ORIENTATION
ORIENTATION: RIGHT
ORIENTATION: RIGHT

## 2024-04-03 ASSESSMENT — PAIN DESCRIPTION - FREQUENCY: FREQUENCY: INTERMITTENT

## 2024-04-03 ASSESSMENT — PAIN - FUNCTIONAL ASSESSMENT
PAIN_FUNCTIONAL_ASSESSMENT: PREVENTS OR INTERFERES SOME ACTIVE ACTIVITIES AND ADLS
PAIN_FUNCTIONAL_ASSESSMENT: 0-10

## 2024-04-03 ASSESSMENT — PAIN DESCRIPTION - PAIN TYPE: TYPE: ACUTE PAIN

## 2024-04-03 ASSESSMENT — PAIN DESCRIPTION - ONSET: ONSET: SUDDEN

## 2024-04-03 ASSESSMENT — PAIN DESCRIPTION - LOCATION
LOCATION: SHOULDER
LOCATION: SHOULDER

## 2024-04-04 RX ORDER — AMOXICILLIN AND CLAVULANATE POTASSIUM 875; 125 MG/1; MG/1
1 TABLET, FILM COATED ORAL 2 TIMES DAILY
Qty: 14 TABLET | Refills: 0 | Status: SHIPPED | OUTPATIENT
Start: 2024-04-04 | End: 2024-04-11

## 2024-04-04 RX ORDER — METHOCARBAMOL 750 MG/1
750 TABLET, FILM COATED ORAL 3 TIMES DAILY PRN
Qty: 15 TABLET | Refills: 0 | Status: SHIPPED | OUTPATIENT
Start: 2024-04-04 | End: 2024-04-09
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
## 2024-04-25 ENCOUNTER — HOSPITAL ENCOUNTER (EMERGENCY)
Facility: HOSPITAL | Age: 24
Discharge: HOME | End: 2024-04-25
Attending: STUDENT IN AN ORGANIZED HEALTH CARE EDUCATION/TRAINING PROGRAM
Payer: COMMERCIAL

## 2024-04-25 ENCOUNTER — APPOINTMENT (OUTPATIENT)
Facility: HOSPITAL | Age: 24
End: 2024-04-25
Payer: COMMERCIAL

## 2024-04-25 VITALS
TEMPERATURE: 97.6 F | HEART RATE: 67 BPM | RESPIRATION RATE: 16 BRPM | DIASTOLIC BLOOD PRESSURE: 81 MMHG | BODY MASS INDEX: 42.77 KG/M2 | SYSTOLIC BLOOD PRESSURE: 119 MMHG | HEIGHT: 68 IN | OXYGEN SATURATION: 99 % | WEIGHT: 282.19 LBS

## 2024-04-25 DIAGNOSIS — M25.521 ELBOW PAIN, RIGHT: ICD-10-CM
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
## 2024-04-25 DIAGNOSIS — M25.511 ACUTE PAIN OF RIGHT SHOULDER: ICD-10-CM

## 2024-04-25 PROCEDURE — 73080 X-RAY EXAM OF ELBOW: CPT

## 2024-04-25 PROCEDURE — 99283 EMERGENCY DEPT VISIT LOW MDM: CPT

## 2024-04-25 PROCEDURE — 73090 X-RAY EXAM OF FOREARM: CPT

## 2024-04-25 PROCEDURE — 73030 X-RAY EXAM OF SHOULDER: CPT

## 2024-04-25 PROCEDURE — 6370000000 HC RX 637 (ALT 250 FOR IP)

## 2024-04-25 RX ORDER — NAPROXEN 500 MG/1
500 TABLET ORAL 2 TIMES DAILY WITH MEALS
Qty: 20 TABLET | Refills: 0 | Status: SHIPPED | OUTPATIENT
Start: 2024-04-25 | End: 2024-05-05

## 2024-04-25 RX ORDER — LIDOCAINE 50 MG/G
1 PATCH TOPICAL DAILY
Qty: 10 PATCH | Refills: 0 | Status: SHIPPED | OUTPATIENT
Start: 2024-04-25 | End: 2024-05-05

## 2024-04-25 RX ORDER — IBUPROFEN 400 MG/1
400 TABLET ORAL
Status: COMPLETED | OUTPATIENT
Start: 2024-04-25 | End: 2024-04-25

## 2024-04-25 RX ORDER — ACETAMINOPHEN 500 MG
1000 TABLET ORAL ONCE
Status: COMPLETED | OUTPATIENT
Start: 2024-04-25 | End: 2024-04-25

## 2024-04-25 RX ADMIN — IBUPROFEN 400 MG: 400 TABLET, FILM COATED ORAL at 14:20

## 2024-04-25 RX ADMIN — ACETAMINOPHEN 1000 MG: 500 TABLET ORAL at 14:20

## 2024-04-25 ASSESSMENT — PAIN DESCRIPTION - ORIENTATION: ORIENTATION: RIGHT

## 2024-04-25 ASSESSMENT — PAIN - FUNCTIONAL ASSESSMENT
PAIN_FUNCTIONAL_ASSESSMENT: 0-10
PAIN_FUNCTIONAL_ASSESSMENT: ACTIVITIES ARE NOT PREVENTED

## 2024-04-25 ASSESSMENT — PAIN DESCRIPTION - FREQUENCY: FREQUENCY: INTERMITTENT

## 2024-04-25 ASSESSMENT — PAIN DESCRIPTION - DESCRIPTORS: DESCRIPTORS: ACHING

## 2024-04-25 ASSESSMENT — PAIN SCALES - GENERAL: PAINLEVEL_OUTOF10: 8

## 2024-04-25 ASSESSMENT — PAIN DESCRIPTION - LOCATION: LOCATION: ARM

## 2024-04-25 ASSESSMENT — PAIN DESCRIPTION - ONSET: ONSET: SUDDEN

## 2024-04-25 ASSESSMENT — PAIN DESCRIPTION - PAIN TYPE: TYPE: ACUTE PAIN

## 2024-04-25 NOTE — ED PROVIDER NOTES
[facility] EMERGENCY DEP  EMERGENCY DEPARTMENT ENCOUNTER      Date: 4/25/2024  Patient Name: Tori Lipscomb  MRN: [id number]  Birthdate 2000  Date of evaluation: 4/25/2024  Provider: CLEO Mijares NP   Note Started: 12:47 PM EDT 4/25/24    CHIEF COMPLAINT     Chief Complaint   Patient presents with    Fall       HISTORY OF PRESENT ILLNESS  (Onset, Location, Duration, Character, Alleviating/Aggravating, Radiation, Timing, Severity)   Note limiting factors.   History Provided By: Patient and partner    HPI: Tori Lipscomb is a 23 y.o. female with a history of right shoulder surgery, back surgery, hypothyroid, anemia presents with right arm and shoulder pain after a fall from standing. States she tripped. Denies head injury, loss of consciousness, nausea, vomiting, chest pain, dyspnea, numbness, weakness, wounds. Has not taken any pain meds.     Nursing Notes and triage vitals were reviewed.  PCP: Jourdan Cramer MD      PAST MEDICAL HISTORY   Past Medical History:  Past Medical History:   Diagnosis Date    Adverse effect of anesthesia     REQUIRES A LOT OF ANESTHESIA PER MOTHER    Anemia     Anxiety     Attention deficit disorder (ADD)     Closed fracture of metatarsal neck 11/11/2015    [facility], 9/15/15 with Dr. Kenney Good alignment and healing     Conversion disorder     Depression     Hx of seasonal allergies     Hypothyroidism 1/8/2014    Kyphosis     Kyphosis     Obesity     Scoliosis     Scoliosis     Snoring     Vision decreased     Vitamin D deficiency 1/8/2014    Took 12 weeks of weekly 20405 international units of vitamin d and now on 2000 international units  daily          Past Surgical History:  Past Surgical History:   Procedure Laterality Date    BACK SURGERY  2009    Thoracic cage with screws    KNEE ARTHROSCOPY  01/2017    LEFT KNEE       Family History:  Family History   Problem Relation Age of Onset    No Known Problems Maternal Aunt     No Known Problems Maternal

## 2024-04-25 NOTE — ED TRIAGE NOTES
Patient arrives after fall yesterday. Patient reports ain to RIGHT arm and shoulder. Reports she is unable to move without pain. Denies LOC or hitting head.

## 2024-04-25 NOTE — DISCHARGE INSTRUCTIONS
Thank you for allowing us to provide you with medical care today.  We realize that you have many choices for your emergency care needs.  We thank you for choosing [facility].  Please choose us in the future for any continued health care needs.     We hope we addressed all of your medical concerns. We strive to provide excellent quality care in the Emergency Department.  Anything less than excellent does not meet our expectations.     The exam and treatment you received in the Emergency Department were for an emergent problem and are not intended as complete care. It is important that you follow up with a doctor, nurse practitioner, or physician’s assistant for ongoing care. If your symptoms worsen or you do not improve as expected and you are unable to reach your usual health care provider, you should return to the Emergency Department. We are available 24 hours a day.     Take this sheet with you when you go to your follow-up visit.     If you have any problem arranging the follow-up visit, contact the Emergency Department immediately.     Make an appointment your family doctor for follow up of this visit. Return to the ER if you are unable to be seen in a timely manner.     Below is a summary of your results.    Labs  No results found for this or any previous visit (from the past 12 hour(s)).    Radiologic Studies  XR SHOULDER RIGHT (MIN 2 VIEWS)   Final Result   No acute abnormality.      XR ELBOW RIGHT (MIN 3 VIEWS)   Final Result   No acute abnormality.      XR RADIUS ULNA RIGHT (2 VIEWS)   Final Result   No acute fracture of the right radius/ulna.

## 2024-04-29 ENCOUNTER — HOSPITAL ENCOUNTER (EMERGENCY)
Facility: HOSPITAL | Age: 24
Discharge: HOME | End: 2024-04-29
Attending: EMERGENCY MEDICINE
Payer: COMMERCIAL

## 2024-04-29 VITALS
WEIGHT: 280.87 LBS | OXYGEN SATURATION: 97 % | BODY MASS INDEX: 44.08 KG/M2 | HEIGHT: 67 IN | HEART RATE: 70 BPM | DIASTOLIC BLOOD PRESSURE: 61 MMHG | TEMPERATURE: 98.8 F | SYSTOLIC BLOOD PRESSURE: 109 MMHG | RESPIRATION RATE: 13 BRPM

## 2024-04-29 DIAGNOSIS — M25.511 ACUTE PAIN OF RIGHT SHOULDER: Primary | ICD-10-CM

## 2024-04-29 PROCEDURE — 99283 EMERGENCY DEPT VISIT LOW MDM: CPT

## 2024-04-29 RX ORDER — LIDOCAINE 50 MG/G
1 PATCH TOPICAL DAILY
Qty: 10 PATCH | Refills: 0 | Status: SHIPPED | OUTPATIENT
Start: 2024-04-29 | End: 2024-05-09

## 2024-04-29 RX ORDER — NAPROXEN 500 MG/1
500 TABLET ORAL 2 TIMES DAILY WITH MEALS
Qty: 30 TABLET | Refills: 0 | Status: SHIPPED | OUTPATIENT
Start: 2024-04-29

## 2024-04-29 RX ORDER — PREDNISONE 20 MG/1
20 TABLET ORAL 2 TIMES DAILY
Qty: 10 TABLET | Refills: 0 | Status: SHIPPED | OUTPATIENT
Start: 2024-04-29 | End: 2024-05-04

## 2024-04-29 ASSESSMENT — ENCOUNTER SYMPTOMS
SHORTNESS OF BREATH: 0
DIARRHEA: 0
VOMITING: 0
COUGH: 0
EYE PAIN: 0
ABDOMINAL PAIN: 0
SORE THROAT: 0
NAUSEA: 0

## 2024-04-29 ASSESSMENT — PAIN DESCRIPTION - DESCRIPTORS: DESCRIPTORS: ACHING

## 2024-04-29 ASSESSMENT — PAIN - FUNCTIONAL ASSESSMENT: PAIN_FUNCTIONAL_ASSESSMENT: 0-10

## 2024-04-29 ASSESSMENT — PAIN SCALES - GENERAL: PAINLEVEL_OUTOF10: 8

## 2024-04-29 ASSESSMENT — PAIN DESCRIPTION - LOCATION: LOCATION: ARM;SHOULDER;LEG

## 2024-04-29 ASSESSMENT — PAIN DESCRIPTION - PAIN TYPE: TYPE: ACUTE PAIN

## 2024-04-29 ASSESSMENT — PAIN DESCRIPTION - ORIENTATION: ORIENTATION: RIGHT

## 2024-04-29 NOTE — ED NOTES
Patient is discharged home by DEVANTE Mcdaniel. Alert, oriented, and ambulatory. Patient is in no distress. Education given regarding follow up and medication given by Amrit.
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
## 2024-04-29 NOTE — ED PROVIDER NOTES
R-0Normal               (Please note that portions of this note were completed with a voice recognition program.  Efforts were made to edit the dictations but occasionally words are mis-transcribed.)    DEVANTE JORDAN (electronically signed)  Emergency Attending Physician / Physician Assistant / Nurse Practitioner              Demetra Marcus PA  04/29/24 2016

## (undated) DEVICE — 4-PORT MANIFOLD: Brand: NEPTUNE 2

## (undated) DEVICE — DEVON™ KNEE AND BODY STRAP 60" X 3" (1.5 M X 7.6 CM): Brand: DEVON

## (undated) DEVICE — DRAPE,EXTREMITY,89X128,STERILE: Brand: MEDLINE

## (undated) DEVICE — SUTURE VCRL SZ 2-0 L27IN ABSRB UD L26MM SH 1/2 CIR J417H

## (undated) DEVICE — ARTHROSCOPY RICHMOND-LF: Brand: MEDLINE INDUSTRIES, INC.

## (undated) DEVICE — SOLUTION IRRIG 3000ML LAC R FLX CONT

## (undated) DEVICE — GOWN,SIRUS,NONRNF,SETINSLV,2XL,18/CS: Brand: MEDLINE

## (undated) DEVICE — X-RAY SPONGES,16 PLY: Brand: DERMACEA

## (undated) DEVICE — SUTURE MCRYL SZ 4-0 L27IN ABSRB UD L19MM PS-2 1/2 CIR PRIM Y426H

## (undated) DEVICE — SUT ETHLN 3-0 18IN PS1 BLK --

## (undated) DEVICE — (D)STRIP SKN CLSR 0.5X4IN WHT --

## (undated) DEVICE — (D)PREP SKN CHLRAPRP APPL 26ML -- CONVERT TO ITEM 371833

## (undated) DEVICE — 4.5 MM INCISOR STRAIGHT BLADES,                                    POWER/EP-1, LIME GREEN, PACKAGED 6                                    PER BOX, STERILE

## (undated) DEVICE — DYONICS 25 INFLOW/OUTFLOW TUBE                                    SET, SINGLE SUCTION, 3 PER BOX

## (undated) DEVICE — GAUZE SPONGES,12 PLY: Brand: CURITY

## (undated) DEVICE — STERILE POLYISOPRENE POWDER-FREE SURGICAL GLOVES: Brand: PROTEXIS

## (undated) DEVICE — Z INACTIVE NO USAGE TURNOVER KIT RM CLEANOP

## (undated) DEVICE — INFECTION CONTROL KIT SYS